# Patient Record
Sex: FEMALE | Race: WHITE | NOT HISPANIC OR LATINO | Employment: FULL TIME | URBAN - METROPOLITAN AREA
[De-identification: names, ages, dates, MRNs, and addresses within clinical notes are randomized per-mention and may not be internally consistent; named-entity substitution may affect disease eponyms.]

---

## 2017-01-18 ENCOUNTER — OFFICE VISIT (OUTPATIENT)
Dept: INTERNAL MEDICINE | Facility: CLINIC | Age: 57
End: 2017-01-18

## 2017-01-18 VITALS
OXYGEN SATURATION: 98 % | TEMPERATURE: 97.5 F | BODY MASS INDEX: 33.46 KG/M2 | WEIGHT: 196 LBS | RESPIRATION RATE: 16 BRPM | HEART RATE: 66 BPM | HEIGHT: 64 IN | DIASTOLIC BLOOD PRESSURE: 80 MMHG | SYSTOLIC BLOOD PRESSURE: 130 MMHG

## 2017-01-18 DIAGNOSIS — E03.9 ACQUIRED HYPOTHYROIDISM: ICD-10-CM

## 2017-01-18 DIAGNOSIS — E78.5 HYPERLIPIDEMIA, UNSPECIFIED HYPERLIPIDEMIA TYPE: ICD-10-CM

## 2017-01-18 DIAGNOSIS — R73.02 IMPAIRED GLUCOSE TOLERANCE: ICD-10-CM

## 2017-01-18 DIAGNOSIS — K63.5 COLON POLYP: Primary | ICD-10-CM

## 2017-01-18 PROCEDURE — 99213 OFFICE O/P EST LOW 20 MIN: CPT | Performed by: INTERNAL MEDICINE

## 2017-01-18 NOTE — PROGRESS NOTES
Subjective   Bhavna Elliott is a 56 y.o. female.   12/30/2016  Wt Readings from Last 1 Encounters:   05/23/16 194 lb (88 kg)    she was last seen May 2016, weight 190 then, now 196.    She returns for follow-up of hypothyroidism, hyperlipidemia, impaired glucose tolerance    History of Present Illness   She has hypothyroidism which is treated with levothyroxin 112 µg daily.  No palpitations or other symptoms described.    She has hyperlipidemia treated with Crestor 10 mg daily.  No problems with the medication.  She exercises at a gym about 3 days a week and does treadmill for 30-40 minutes and then some weight exercises for her upper extremities.  No exertional chest pain has occurred.    She has impaired glucose tolerance for which diet weight reduction and regular exercise been advised.  She does not describe polydipsia polyuria or nocturia.    She is perimenopausal and is followed by Dr. Melvin Atwood, gynecologist.    She had colonoscopy July 2012 revealing a sigmoid colon polyp.  She will be due for repeat screening colonoscopy this year.  The following portions of the patient's history were reviewed and updated as appropriate: allergies, current medications, past family history, past medical history, past social history, past surgical history and problem list.    Review of Systems   Constitutional: Negative.    HENT: Negative.    Eyes: Negative.    Respiratory: Negative.    Cardiovascular: Negative.    Endocrine: Negative.    Genitourinary: Negative.    Skin: Negative.    Neurological: Negative.    Hematological: Negative.    Psychiatric/Behavioral: Negative.        Objective   Physical Exam   Constitutional: She appears well-developed and well-nourished.   HENT:   Head: Normocephalic and atraumatic.   Cardiovascular: Normal rate and regular rhythm.  Exam reveals no gallop and no friction rub.    No murmur heard.  Pulmonary/Chest: Effort normal and breath sounds normal. No respiratory distress. She has no  wheezes. She has no rales.   Abdominal: Soft. Bowel sounds are normal. She exhibits no distension and no mass. There is no tenderness.   Neurological: She is alert.   Skin: Skin is warm.   Psychiatric: Her behavior is normal.   Vitals reviewed.      Assessment/Plan   Bhavna was seen today for hyperlipidemia, hypothyroidism and prediabetes.    Diagnoses and all orders for this visit:    Colon polyp  Comments:  Sigmoid colon polyp on colonoscopy July 2012, due for a repeat colonoscopy around July 2017, Dr. Saldana    Hyperlipidemia, unspecified hyperlipidemia type  Comments:  Continue Crestor 10 mg daily, we will check chemistries and lipids  Orders:  -     Comprehensive Metabolic Panel  -     Lipid Panel    Impaired glucose tolerance  Comments:  Advise healthy diet weight reduction and regular exercise, will check hemoglobin A1c level  Orders:  -     Hemoglobin A1c    Acquired hypothyroidism  Comments:  Continue levothyroxin 112 µg daily, will check thyroid levels  Orders:  -     TSH     postmenopausal, followed by Dr. Melvin Atwood    Schedule office follow-up about 6 months, return sooner if needed                         EMR Dragon/Transcription disclaimer:      Much of this encounter note is an electronic transcription/translation of spoken language to printed text. The electronic translation of spoken language may permit erroneous, or at times, nonsensical words or phrases to be inadvertently transcribed; Although I have reviewed the note for such errors, some may still exist.

## 2017-01-18 NOTE — MR AVS SNAPSHOT
Bhavna Atwooding   1/18/2017 11:00 AM   Office Visit    Dept Phone:  [phone]   Encounter #:  [id number]    Provider:  Oscar Syed MD   Department:  [facility] INTERNAL MEDICINE                Your Full Care Plan              Your Updated Medication List          This list is accurate as of: 1/18/17 11:35 AM.  Always use your most recent med list.                CRESTOR 10 MG tablet   Generic drug:  rosuvastatin   TAKE 1 TABLET DAILY       levothyroxine 112 MCG tablet   Commonly known as:  SYNTHROID, LEVOTHROID   TAKE 1 TABLET BY MOUTH EVERY DAY FOR THYROID               We Performed the Following     Comprehensive Metabolic Panel     Hemoglobin A1c     Lipid Panel     TSH       You Were Diagnosed With        Codes Comments    Colon polyp    -  Primary ICD-10-CM: K63.5  ICD-9-CM: 211.3 Sigmoid colon polyp on colonoscopy July 2012, due for a repeat colonoscopy around July 2017, Dr. Saldana    Hyperlipidemia, unspecified hyperlipidemia type     ICD-10-CM: E78.5  ICD-9-CM: 272.4 Continue Crestor 10 mg daily, we will check chemistries and lipids    Impaired glucose tolerance     ICD-10-CM: R73.02  ICD-9-CM: 790.22 Advise healthy diet weight reduction and regular exercise, will check hemoglobin A1c level    Acquired hypothyroidism     ICD-10-CM: E03.9  ICD-9-CM: 244.9 Continue levothyroxin 112 µg daily, will check thyroid levels      Instructions     None    Patient Instructions History      Upcoming Appointments     Visit Type Date Time Department    OFFICE VISIT 1/18/2017 11:00 AM [facility] 1 [id number]    OFFICE VISIT 7/26/2017  9:40 AM [facility] 1 4003      MyChart Signup     Our records indicate that you have declined [employer] signup. If you would like to sign up for [employer], please email [email or web address] or call [phone] to obtain an activation code.             Other Info from Your Visit           Your Appointments     Jul 26, 2017   "9:40 AM EDT   Office Visit with Oscar Syed MD   [facility] INTERNAL MEDICINE (--)    [address] [facility]  [place] [address] [phone]           Arrive 15 minutes prior to appointment.              Allergies     No Known Allergies      Reason for Visit     Hyperlipidemia     Hypothyroidism     Prediabetes           Vital Signs     Blood Pressure Pulse Temperature Respirations Height Weight    130/80 (BP Location: Left arm, Patient Position: Sitting, Cuff Size: Large Adult) 66 97.5 °F (36.4 °C) (Tympanic) 16 64\" (162.6 cm) 196 lb (88.9 kg)    Oxygen Saturation Body Mass Index Smoking Status             98% 33.64 kg/m2 Never Smoker         Problems and Diagnoses Noted     Colon polyp    High cholesterol or triglycerides    Underactive thyroid    Impaired glucose tolerance        "

## 2017-01-19 LAB
ALBUMIN SERPL-MCNC: 4.7 G/DL (ref 3.5–5.2)
ALBUMIN/GLOB SERPL: 1.5 G/DL
ALP SERPL-CCNC: 42 U/L (ref 39–117)
ALT SERPL-CCNC: 24 U/L (ref 1–33)
AST SERPL-CCNC: 20 U/L (ref 1–32)
BILIRUB SERPL-MCNC: 0.7 MG/DL (ref 0.1–1.2)
BUN SERPL-MCNC: 14 MG/DL (ref 6–20)
BUN/CREAT SERPL: 17.7 (ref 7–25)
CALCIUM SERPL-MCNC: 9.9 MG/DL (ref 8.6–10.5)
CHLORIDE SERPL-SCNC: 105 MMOL/L (ref 98–107)
CHOLEST SERPL-MCNC: 197 MG/DL (ref 0–200)
CO2 SERPL-SCNC: 25.3 MMOL/L (ref 22–29)
CREAT SERPL-MCNC: 0.79 MG/DL (ref 0.57–1)
GLOBULIN SER CALC-MCNC: 3.1 GM/DL
GLUCOSE SERPL-MCNC: 105 MG/DL (ref 65–99)
HBA1C MFR BLD: 5.4 % (ref 4.8–5.6)
HDLC SERPL-MCNC: 64 MG/DL (ref 40–60)
LDLC SERPL CALC-MCNC: 119 MG/DL (ref 0–100)
POTASSIUM SERPL-SCNC: 4.2 MMOL/L (ref 3.5–5.2)
PROT SERPL-MCNC: 7.8 G/DL (ref 6–8.5)
SODIUM SERPL-SCNC: 143 MMOL/L (ref 136–145)
TRIGL SERPL-MCNC: 72 MG/DL (ref 0–150)
TSH SERPL DL<=0.005 MIU/L-ACNC: 1.4 MIU/ML (ref 0.27–4.2)
VLDLC SERPL CALC-MCNC: 14.4 MG/DL (ref 5–40)

## 2017-02-09 ENCOUNTER — APPOINTMENT (OUTPATIENT)
Dept: WOMENS IMAGING | Facility: HOSPITAL | Age: 57
End: 2017-02-09

## 2017-02-09 PROCEDURE — 77067 SCR MAMMO BI INCL CAD: CPT | Performed by: RADIOLOGY

## 2017-02-24 ENCOUNTER — APPOINTMENT (OUTPATIENT)
Dept: WOMENS IMAGING | Facility: HOSPITAL | Age: 57
End: 2017-02-24

## 2017-02-24 PROCEDURE — 77065 DX MAMMO INCL CAD UNI: CPT | Performed by: RADIOLOGY

## 2017-02-24 PROCEDURE — 76641 ULTRASOUND BREAST COMPLETE: CPT | Performed by: RADIOLOGY

## 2017-02-24 PROCEDURE — G0206 DX MAMMO INCL CAD UNI: HCPCS | Performed by: RADIOLOGY

## 2017-02-24 PROCEDURE — G0279 TOMOSYNTHESIS, MAMMO: HCPCS | Performed by: RADIOLOGY

## 2017-02-24 PROCEDURE — 77061 BREAST TOMOSYNTHESIS UNI: CPT | Performed by: RADIOLOGY

## 2017-04-27 RX ORDER — LEVOTHYROXINE SODIUM 112 UG/1
TABLET ORAL
Qty: 90 TABLET | Refills: 0 | Status: SHIPPED | OUTPATIENT
Start: 2017-04-27 | End: 2017-09-02 | Stop reason: SDUPTHER

## 2017-05-15 RX ORDER — ROSUVASTATIN CALCIUM 10 MG/1
TABLET, COATED ORAL
Qty: 90 TABLET | Refills: 2 | Status: SHIPPED | OUTPATIENT
Start: 2017-05-15 | End: 2018-02-16 | Stop reason: SDUPTHER

## 2017-08-14 RX ORDER — LEVOTHYROXINE SODIUM 112 UG/1
TABLET ORAL
Qty: 90 TABLET | Refills: 0 | OUTPATIENT
Start: 2017-08-14

## 2017-08-30 ENCOUNTER — OFFICE VISIT (OUTPATIENT)
Dept: INTERNAL MEDICINE | Facility: CLINIC | Age: 57
End: 2017-08-30

## 2017-08-30 VITALS
BODY MASS INDEX: 34.49 KG/M2 | RESPIRATION RATE: 16 BRPM | SYSTOLIC BLOOD PRESSURE: 130 MMHG | WEIGHT: 202 LBS | HEIGHT: 64 IN | HEART RATE: 68 BPM | TEMPERATURE: 96.7 F | OXYGEN SATURATION: 97 % | DIASTOLIC BLOOD PRESSURE: 80 MMHG

## 2017-08-30 DIAGNOSIS — R73.02 IMPAIRED GLUCOSE TOLERANCE: ICD-10-CM

## 2017-08-30 DIAGNOSIS — K63.5 COLON POLYP: ICD-10-CM

## 2017-08-30 DIAGNOSIS — E78.5 HYPERLIPIDEMIA, UNSPECIFIED HYPERLIPIDEMIA TYPE: ICD-10-CM

## 2017-08-30 DIAGNOSIS — E03.9 ACQUIRED HYPOTHYROIDISM: Primary | ICD-10-CM

## 2017-08-30 LAB — HBA1C MFR BLD: 5.9 % (ref 4.8–5.6)

## 2017-08-30 PROCEDURE — 99213 OFFICE O/P EST LOW 20 MIN: CPT | Performed by: INTERNAL MEDICINE

## 2017-08-30 NOTE — PROGRESS NOTES
Subjective   Bhavna Elliott is a 56 y.o. female.   8/11/2017  Wt Readings from Last 1 Encounters:   08/30/17 202 lb (91.6 kg)    she was last seen in January 2017, weight 194 pounds then, now 202.    She returns for follow-up of hypothyroidism, hyperlipidemia, impaired glucose tolerance, history of colon polyps    History of Present Illness    she has hypothyroidism treated with levothyroxin 112 µg daily.  She does not describe any palpitations or other thyroid symptoms.    She has hyperlipidemia treated with Crestor 10 mg daily.  This is probably familial hyperlipidemia.  No medication problems described.  No cardiac symptoms have occurred in no cardiac history.    She has impaired glucose tolerance for which diet, weight reduction, and regular exercise of been advised.  Her last hemoglobin A1c level was 5.4% in January.    She had colonoscopy July 2012 revealing sigmoid colon polyp.  She is going to see Dr. Saldana for colonoscopy later this year.    Dr. Melvin Atwood is her gynecologist  The following portions of the patient's history were reviewed and updated as appropriate: allergies, current medications, past family history, past medical history, past social history, past surgical history and problem list.    Review of Systems   Constitutional: Negative.    HENT: Negative.    Eyes: Negative.    Respiratory: Negative.    Cardiovascular: Negative.    Endocrine: Negative.    Genitourinary: Negative.    Skin: Negative.    Neurological: Negative.    Hematological: Negative.    Psychiatric/Behavioral: Negative.        Objective   Physical Exam   Constitutional: She appears well-developed and well-nourished.   HENT:   Head: Normocephalic and atraumatic.   Cardiovascular: Normal rate and regular rhythm.  Exam reveals no gallop and no friction rub.    No murmur heard.  Pulmonary/Chest: Effort normal and breath sounds normal. No respiratory distress. She has no wheezes. She has no rales.   Abdominal: Soft. Bowel sounds  are normal. She exhibits no distension and no mass. There is no tenderness.   Neurological: She is alert.   Skin: Skin is warm.   Psychiatric: Her behavior is normal.   Vitals reviewed.      Assessment/Plan   Bhavna was seen today for hypothyroidism, hyperlipidemia and prediabetes.    Diagnoses and all orders for this visit:    Acquired hypothyroidism  Comments:   continue levothyroxin 112   Orders:  -     TSH  -     T4, Free    Hyperlipidemia, unspecified hyperlipidemia type  Comments:   continue Crestor 10 mg daily, we will check chemistries and lipids  Orders:  -     Comprehensive Metabolic Panel  -     Lipid Panel    Impaired glucose tolerance  Comments:   advise healthy diet, weight reduction, regular exercise, will check hemoglobin A1c level  Orders:  -     Hemoglobin A1c    Colon polyp  Comments:   to see Dr. Saldana for colonoscopy           schedule office follow-up about 6 months, return sooner if needed                     EMR Dragon/Transcription disclaimer:      Much of this encounter note is an electronic transcription/translation of spoken language to printed text. The electronic translation of spoken language may permit erroneous, or at times, nonsensical words or phrases to be inadvertently transcribed; Although I have reviewed the note for such errors, some may still exist.

## 2017-08-31 LAB
ALBUMIN SERPL-MCNC: 4.7 G/DL (ref 3.5–5.2)
ALBUMIN/GLOB SERPL: 1.4 G/DL
ALP SERPL-CCNC: 40 U/L (ref 39–117)
ALT SERPL-CCNC: 22 U/L (ref 1–33)
AST SERPL-CCNC: 18 U/L (ref 1–32)
BILIRUB SERPL-MCNC: 0.4 MG/DL (ref 0.1–1.2)
BUN SERPL-MCNC: 15 MG/DL (ref 6–20)
BUN/CREAT SERPL: 20.5 (ref 7–25)
CALCIUM SERPL-MCNC: 10.1 MG/DL (ref 8.6–10.5)
CHLORIDE SERPL-SCNC: 101 MMOL/L (ref 98–107)
CHOLEST SERPL-MCNC: 181 MG/DL (ref 0–200)
CO2 SERPL-SCNC: 24.5 MMOL/L (ref 22–29)
CREAT SERPL-MCNC: 0.73 MG/DL (ref 0.57–1)
GLOBULIN SER CALC-MCNC: 3.3 GM/DL
GLUCOSE SERPL-MCNC: 94 MG/DL (ref 65–99)
HDLC SERPL-MCNC: 58 MG/DL (ref 40–60)
LDLC SERPL CALC-MCNC: 102 MG/DL (ref 0–100)
POTASSIUM SERPL-SCNC: 4.3 MMOL/L (ref 3.5–5.2)
PROT SERPL-MCNC: 8 G/DL (ref 6–8.5)
SODIUM SERPL-SCNC: 141 MMOL/L (ref 136–145)
T4 FREE SERPL-MCNC: 1.4 NG/DL (ref 0.93–1.7)
TRIGL SERPL-MCNC: 104 MG/DL (ref 0–150)
TSH SERPL DL<=0.005 MIU/L-ACNC: 1.14 MIU/ML (ref 0.27–4.2)
VLDLC SERPL CALC-MCNC: 20.8 MG/DL (ref 5–40)

## 2017-09-05 RX ORDER — LEVOTHYROXINE SODIUM 112 UG/1
TABLET ORAL
Qty: 90 TABLET | Refills: 0 | Status: SHIPPED | OUTPATIENT
Start: 2017-09-05 | End: 2017-12-08 | Stop reason: SDUPTHER

## 2017-09-08 ENCOUNTER — PREP FOR SURGERY (OUTPATIENT)
Dept: OTHER | Facility: HOSPITAL | Age: 57
End: 2017-09-08

## 2017-09-08 DIAGNOSIS — Z12.11 COLON CANCER SCREENING: Primary | ICD-10-CM

## 2017-09-08 DIAGNOSIS — Z86.010 HISTORY OF COLON POLYPS: ICD-10-CM

## 2017-09-08 PROBLEM — Z86.0100 HISTORY OF COLON POLYPS: Status: ACTIVE | Noted: 2017-09-08

## 2017-11-20 ENCOUNTER — HOSPITAL ENCOUNTER (OUTPATIENT)
Facility: HOSPITAL | Age: 57
Setting detail: HOSPITAL OUTPATIENT SURGERY
Discharge: HOME | End: 2017-11-20
Attending: SURGERY | Admitting: SURGERY

## 2017-11-20 ENCOUNTER — ANESTHESIA EVENT (OUTPATIENT)
Dept: GASTROENTEROLOGY | Facility: HOSPITAL | Age: 57
End: 2017-11-20

## 2017-11-20 ENCOUNTER — ANESTHESIA (OUTPATIENT)
Dept: GASTROENTEROLOGY | Facility: HOSPITAL | Age: 57
End: 2017-11-20

## 2017-11-20 VITALS
RESPIRATION RATE: 18 BRPM | BODY MASS INDEX: 32.89 KG/M2 | HEIGHT: 65 IN | OXYGEN SATURATION: 96 % | SYSTOLIC BLOOD PRESSURE: 116 MMHG | HEART RATE: 61 BPM | WEIGHT: 197.38 LBS | TEMPERATURE: 98 F | DIASTOLIC BLOOD PRESSURE: 78 MMHG

## 2017-11-20 DIAGNOSIS — Z86.010 HISTORY OF COLON POLYPS: ICD-10-CM

## 2017-11-20 DIAGNOSIS — Z12.11 COLON CANCER SCREENING: ICD-10-CM

## 2017-11-20 PROCEDURE — 88305 TISSUE EXAM BY PATHOLOGIST: CPT | Performed by: SURGERY

## 2017-11-20 PROCEDURE — 25010000002 GLUCAGON (RDNA) PER 1 MG: Performed by: SURGERY

## 2017-11-20 PROCEDURE — 45380 COLONOSCOPY AND BIOPSY: CPT | Performed by: SURGERY

## 2017-11-20 PROCEDURE — 25010000002 PROPOFOL 1000 MG/ML EMULSION: Performed by: ANESTHESIOLOGY

## 2017-11-20 PROCEDURE — 25010000002 PROPOFOL 10 MG/ML EMULSION: Performed by: ANESTHESIOLOGY

## 2017-11-20 RX ORDER — PROPOFOL 10 MG/ML
VIAL (ML) INTRAVENOUS AS NEEDED
Status: DISCONTINUED | OUTPATIENT
Start: 2017-11-20 | End: 2017-11-20 | Stop reason: SURG

## 2017-11-20 RX ORDER — LIDOCAINE HYDROCHLORIDE 10 MG/ML
0.5 INJECTION, SOLUTION INFILTRATION; PERINEURAL ONCE AS NEEDED
Status: DISCONTINUED | OUTPATIENT
Start: 2017-11-20 | End: 2017-11-20 | Stop reason: HOSPADM

## 2017-11-20 RX ORDER — LIDOCAINE HYDROCHLORIDE 20 MG/ML
INJECTION, SOLUTION INFILTRATION; PERINEURAL AS NEEDED
Status: DISCONTINUED | OUTPATIENT
Start: 2017-11-20 | End: 2017-11-20 | Stop reason: SURG

## 2017-11-20 RX ORDER — SODIUM CHLORIDE, SODIUM LACTATE, POTASSIUM CHLORIDE, CALCIUM CHLORIDE 600; 310; 30; 20 MG/100ML; MG/100ML; MG/100ML; MG/100ML
1000 INJECTION, SOLUTION INTRAVENOUS CONTINUOUS PRN
Status: DISCONTINUED | OUTPATIENT
Start: 2017-11-20 | End: 2017-11-20 | Stop reason: HOSPADM

## 2017-11-20 RX ORDER — SODIUM CHLORIDE 0.9 % (FLUSH) 0.9 %
3 SYRINGE (ML) INJECTION AS NEEDED
Status: DISCONTINUED | OUTPATIENT
Start: 2017-11-20 | End: 2017-11-20 | Stop reason: HOSPADM

## 2017-11-20 RX ADMIN — LIDOCAINE HYDROCHLORIDE 50 MG: 20 INJECTION, SOLUTION INFILTRATION; PERINEURAL at 07:47

## 2017-11-20 RX ADMIN — PROPOFOL 160 MG: 10 INJECTION, EMULSION INTRAVENOUS at 07:47

## 2017-11-20 RX ADMIN — PROPOFOL 140 MCG/KG/MIN: 10 INJECTION, EMULSION INTRAVENOUS at 07:47

## 2017-11-20 RX ADMIN — SODIUM CHLORIDE, POTASSIUM CHLORIDE, SODIUM LACTATE AND CALCIUM CHLORIDE 1000 ML: 600; 310; 30; 20 INJECTION, SOLUTION INTRAVENOUS at 06:52

## 2017-11-20 NOTE — H&P
Cc: Endoscopy Visit    HPI: 57 y.o. female here for screening colonoscopy, having had a prior hyperplastic polyp, and no family history of colon cancer.    Past Medical History:   Diagnosis Date   • Disease of thyroid gland        History reviewed. No pertinent surgical history.    has No Known Allergies.       Medication List      ASK your doctor about these medications          hydrocortisone 2.5 % cream       levothyroxine 112 MCG tablet   Commonly known as:  SYNTHROID, LEVOTHROID   TAKE 1 TABLET BY MOUTH EVERY DAY FOR THYROID       rosuvastatin 10 MG tablet   Commonly known as:  CRESTOR   TAKE 1 TABLET BY MOUTH EVERY DAY           Family History   Problem Relation Age of Onset   • Hyperlipidemia Mother    • Alzheimer's disease Father        Social History     Social History   • Marital status:      Spouse name: N/A   • Number of children: N/A   • Years of education: N/A     Occupational History   • Not on file.     Social History Main Topics   • Smoking status: Never Smoker   • Smokeless tobacco: Never Used   • Alcohol use Yes      Comment: Socially   • Drug use: No   • Sexual activity: Defer     Other Topics Concern   • Not on file     Social History Narrative       Vitals:    11/20/17 0646   BP: 131/69   Pulse: 57   Resp: 16   Temp: 98.7 °F (37.1 °C)   SpO2: 97%       Body mass index is 32.84 kg/(m^2).    Physical Exam    General: No acute distress  Lungs: No labored breathing, Pulse oximetry on room air is 97%.  Heart: RRR  Abdo: Soft  Mental:  Awake, alert, and oriented    Imp:     · Screening  · History of hyperplastic polyp     Plan:  · C scope, and if normal, colonoscopy in 10 years.    Nidia Saldana MD  7:46 AM

## 2017-11-20 NOTE — ANESTHESIA POSTPROCEDURE EVALUATION
"Patient: Bhavna JAUREGUI Elliott    Procedure Summary     Date Anesthesia Start Anesthesia Stop Room / Location    11/20/17 0743 0801  [facility] /  [facility]       Procedure Diagnosis Surgeon Provider    COLONOSCOPY TO CECUM  WITH COLD BIOPSY POLYPECTOMY (N/A ) Colon cancer screening; History of colon polyps  (Colon cancer screening [Z12.11]; History of colon polyps [Z86.010]) MD Arianne Ludwig MD          Anesthesia Type: No value filed.  Last vitals  BP   117/71 (11/20/17 0804)   Temp   36.7 °C (98 °F) (11/20/17 0804)   Pulse   61 (11/20/17 0804)   Resp   18 (11/20/17 0804)     SpO2   99 % (11/20/17 0804)     Post Anesthesia Care and Evaluation    Patient location during evaluation: bedside  Patient participation: complete - patient participated  Level of consciousness: awake and alert  Pain management: adequate  Airway patency: patent  Anesthetic complications: No anesthetic complications  PONV Status: none  Cardiovascular status: acceptable  Respiratory status: acceptable  Hydration status: acceptable    Comments: /71 (BP Location: Left arm, Patient Position: Lying)  Pulse 61  Temp 36.7 °C (98 °F) (Oral)   Resp 18  Ht 65\" (165.1 cm)  Wt 197 lb 6 oz (89.5 kg)  SpO2 99%  BMI 32.84 kg/m2        "

## 2017-11-20 NOTE — DISCHARGE INSTRUCTIONS
For the next 24 hours patient needs to be with a responsible adult.    For 24 hours DO NOT drive, operate machinery, appliances, drink alcohol, make important decisions or sign legal documents.    Start with a light or bland diet and advance to regular diet as tolerated.    Follow recommendations on procedure report provided by your doctor.    Call Dr Saldana for problems [phone]    Problems may include but not limited to: large amounts of bleeding, trouble breathing, repeated vomiting, severe unrelieved pain, fever or chills.

## 2017-11-20 NOTE — ANESTHESIA PREPROCEDURE EVALUATION
Anesthesia Evaluation     Patient summary reviewed   NPO Solid Status: > 8 hours  NPO Liquid Status: > 8 hours     Airway   Mallampati: II  TM distance: >3 FB  Dental      Pulmonary    Cardiovascular     Rhythm: regular  Rate: normal    (+) hyperlipidemia      Neuro/Psych  GI/Hepatic/Renal/Endo    (+) obesity,  hypothyroidism,     Musculoskeletal     Abdominal    Substance History      OB/GYN          Other                                        Anesthesia Plan    ASA 2       total IV anesthesia  Anesthetic plan and risks discussed with patient.

## 2017-11-20 NOTE — OP NOTE
Colonoscopy Procedure Note  Bhavna Elliott  1960  Date of Procedure: 11/20/17    Pre-operative Diagnosis:    · screening    Post-operative Diagnosis:  · Minute ascending colon polyp    Procedure: Colonoscopy with cold biopsy polypectomy     Findings/Treatments:   · 3 mm ascending colon polyp, likely lymphocytic hyperplasia/removed via cold biopsy forceps       Recommendations:   · C scope based on pathology.  Call next week for results and recommendations.  · Copy of photographs to be given from today's procedure prior to discharge.    Surgeon: Shana    Anesthetic: MAC per Dr Foote  Indications:  As above    Scope Withdrawal Time:  6 minutes  15 seconds    Procedure Details     MAC anesthesia was induced.  The 180 Colonoscopy was inserted blindly into the rectum and advanced to the cecum, with relative ease,  without need for pressure, lift, or turning.  Cecum was identified by ileocecal valve and appendiceal orifice and photographed for documentation.  Prep quality wasexcellent.  A careful inspection was made as the colonoscope was withdrawn, including a retroflexed view of the rectum; there was no suggestion of presence of angiodysplasias, colitis, but there was the single minute polyp, removed via cold biopsy forceps.  There were no diverticula.    Retroflexion in the rectum revno abnormalities.    Nidia Saldana MD  11/20/17   8:05 AM  Monday

## 2017-11-21 LAB
CYTO UR: NORMAL
LAB AP CASE REPORT: NORMAL
Lab: NORMAL
PATH REPORT.FINAL DX SPEC: NORMAL
PATH REPORT.GROSS SPEC: NORMAL

## 2017-11-22 NOTE — PROGRESS NOTES
Winifred (endoscopy liaison),    Please call patient to inform them of these findings and recommendations and ensure that any pamphlets that were to be given to the patient at the hospital were received.     Please make sure a letter is sent to the patient, recall method entered into the computer and the HIM tab updated as far as need for endoscopy follow up.    Thanks  Dr Saldana    Date of Procedure: 11/20/17     Pre-operative Diagnosis:    · screening     Post-operative Diagnosis:  · Minute ascending colon polyp     Procedure: Colonoscopy with cold biopsy polypectomy      Findings/Treatments:   · 3 mm ascending colon polyp, likely lymphocytic hyperplasia/removed via cold biopsy forceps      Recommendations:   · C scope based on pathology.  Call next week for results and recommendations.;  C SCOPE IN 10 YEARS SINCE POLYP HYPERPLASTIC  · Copy of photographs to be given from today's procedure prior to discharge.

## 2017-11-27 ENCOUNTER — TELEPHONE (OUTPATIENT)
Dept: SURGERY | Facility: CLINIC | Age: 57
End: 2017-11-27

## 2017-11-27 NOTE — TELEPHONE ENCOUNTER
----- Message from Nidia Saldana MD sent at 11/22/2017  4:34 PM EST -----  Winifred (endoscopy liaison),    Please call patient to inform them of these findings and recommendations and ensure that any pamphlets that were to be given to the patient at the hospital were received.     Please make sure a letter is sent to the patient, recall method entered into the computer and the HIM tab updated as far as need for endoscopy follow up.    Thanks  Dr Saldana    Date of Procedure: 11/20/17     Pre-operative Diagnosis:    · screening     Post-operative Diagnosis:  · Minute ascending colon polyp     Procedure: Colonoscopy with cold biopsy polypectomy      Findings/Treatments:   · 3 mm ascending colon polyp, likely lymphocytic hyperplasia/removed via cold biopsy forceps      Recommendations:   · C scope based on pathology.  Call next week for results and recommendations.;  C SCOPE IN 10 YEARS SINCE POLYP HYPERPLASTIC  · Copy of photographs to be given from today's procedure prior to discharge.

## 2017-12-08 RX ORDER — LEVOTHYROXINE SODIUM 112 UG/1
TABLET ORAL
Qty: 90 TABLET | Refills: 0 | Status: SHIPPED | OUTPATIENT
Start: 2017-12-08 | End: 2018-02-24 | Stop reason: SDUPTHER

## 2018-02-16 RX ORDER — ROSUVASTATIN CALCIUM 10 MG/1
TABLET, COATED ORAL
Qty: 90 TABLET | Refills: 2 | Status: SHIPPED | OUTPATIENT
Start: 2018-02-16 | End: 2018-12-18 | Stop reason: SDUPTHER

## 2018-02-26 RX ORDER — LEVOTHYROXINE SODIUM 112 UG/1
TABLET ORAL
Qty: 90 TABLET | Refills: 0 | Status: SHIPPED | OUTPATIENT
Start: 2018-02-26 | End: 2018-06-12 | Stop reason: SDUPTHER

## 2018-03-12 ENCOUNTER — OFFICE VISIT (OUTPATIENT)
Dept: INTERNAL MEDICINE | Facility: CLINIC | Age: 58
End: 2018-03-12

## 2018-03-12 VITALS
WEIGHT: 203.4 LBS | BODY MASS INDEX: 33.89 KG/M2 | HEART RATE: 62 BPM | OXYGEN SATURATION: 98 % | TEMPERATURE: 96.7 F | SYSTOLIC BLOOD PRESSURE: 120 MMHG | HEIGHT: 65 IN | RESPIRATION RATE: 16 BRPM | DIASTOLIC BLOOD PRESSURE: 86 MMHG

## 2018-03-12 DIAGNOSIS — K63.5 POLYP OF ASCENDING COLON, UNSPECIFIED TYPE: ICD-10-CM

## 2018-03-12 DIAGNOSIS — E03.9 ACQUIRED HYPOTHYROIDISM: ICD-10-CM

## 2018-03-12 DIAGNOSIS — R73.02 IMPAIRED GLUCOSE TOLERANCE: ICD-10-CM

## 2018-03-12 DIAGNOSIS — E78.5 HYPERLIPIDEMIA, UNSPECIFIED HYPERLIPIDEMIA TYPE: Primary | ICD-10-CM

## 2018-03-12 PROCEDURE — 99213 OFFICE O/P EST LOW 20 MIN: CPT | Performed by: INTERNAL MEDICINE

## 2018-03-12 NOTE — PROGRESS NOTES
Subjective   Bhavna Elliott is a 57 y.o. female.   2/24/2018  Wt Readings from Last 1 Encounters:   03/12/18 92.3 kg (203 lb 6.4 oz)   She was last seen in August 2017, weight 202 then, now 203.    She returns for follow-up of hypothyroidism, hyperlipidemia, impaired glucose tolerance, history of colon polyps    History of Present Illness   She has hypothyroidism which is treated with levothyroxin 112 µg daily.  No thyroid symptoms are described.    She has hypercholesterolemia treated with Crestor 10 mg daily.  This is probably familial since both parents had lipid problems.  Dental problems with medication.  She does not have any cardiac history or symptoms.  She remains active physically.    She has impaired glucose tolerance and is been recommended healthy diet weight reduction regular exercise.    She had colonoscopy most recently in November 2017 by Dr. Saldana.  She had a small ascending colon polyp.  Previous colonoscopy was July 2012 with a sigmoid colon polyp.  He would seem appropriate for colonoscopy in about 5 years.    Dr. Melvni Atwood is her gynecologist.    She denies having any new problems.  The following portions of the patient's history were reviewed and updated as appropriate: allergies, current medications, past family history, past medical history, past social history, past surgical history and problem list.    Review of Systems   Constitutional: Negative.    HENT: Negative.    Eyes: Negative.    Respiratory: Negative.    Cardiovascular: Negative.    Endocrine: Negative.    Genitourinary: Negative.    Skin: Negative.    Neurological: Negative.    Hematological: Negative.    Psychiatric/Behavioral: Negative.        Objective   Physical Exam   Constitutional: She appears well-developed and well-nourished.   HENT:   Head: Normocephalic and atraumatic.   Cardiovascular: Normal rate and regular rhythm.  Exam reveals no gallop and no friction rub.    No murmur heard.  Pulmonary/Chest: Effort  normal and breath sounds normal. No respiratory distress. She has no wheezes. She has no rales.   Abdominal: Soft. Bowel sounds are normal. She exhibits no distension and no mass. There is no tenderness.   Neurological: She is alert.   Skin: Skin is warm.   Psychiatric: Her behavior is normal.   Vitals reviewed.      Assessment/Plan   Bhavna was seen today for hyperlipidemia, hypothyroidism and prediabetes.    Diagnoses and all orders for this visit:    Hyperlipidemia, unspecified hyperlipidemia type  Comments:  Continue Crestor 10 mg daily, we will check chemistries and lipids  Orders:  -     Comprehensive Metabolic Panel  -     Lipid Panel    Acquired hypothyroidism  Comments:  Continue levothyroxin 112 µg daily, will check thyroid levels  Orders:  -     TSH  -     T4, Free    Impaired glucose tolerance  Comments:  Advise healthy diet weight reduction regular exercise, we will check hemoglobin A1c and urinalysis  Orders:  -     Hemoglobin A1c  -     Urinalysis With Microscopic - Urine, Clean Catch    Polyp of ascending colon, unspecified type  Comments:  Last colonoscopy November 2017, anticipate next colonoscopy around November 2022        Schedule office follow-up about 6 months, return sooner if needed                       EMR Dragon/Transcription disclaimer:      Much of this encounter note is an electronic transcription/translation of spoken language to printed text. The electronic translation of spoken language may permit erroneous, or at times, nonsensical words or phrases to be inadvertently transcribed; Although I have reviewed the note for such errors, some may still exist.

## 2018-03-13 LAB
ALBUMIN SERPL-MCNC: 4.4 G/DL (ref 3.5–5.2)
ALBUMIN/GLOB SERPL: 1.5 G/DL
ALP SERPL-CCNC: 39 U/L (ref 39–117)
ALT SERPL-CCNC: 21 U/L (ref 1–33)
APPEARANCE UR: CLEAR
AST SERPL-CCNC: 15 U/L (ref 1–32)
BACTERIA #/AREA URNS HPF: NORMAL /HPF
BILIRUB SERPL-MCNC: 0.5 MG/DL (ref 0.1–1.2)
BILIRUB UR QL STRIP: NEGATIVE
BUN SERPL-MCNC: 16 MG/DL (ref 6–20)
BUN/CREAT SERPL: 21.3 (ref 7–25)
CALCIUM SERPL-MCNC: 9.4 MG/DL (ref 8.6–10.5)
CASTS URNS MICRO: NORMAL
CHLORIDE SERPL-SCNC: 103 MMOL/L (ref 98–107)
CHOLEST SERPL-MCNC: 185 MG/DL (ref 0–200)
CO2 SERPL-SCNC: 29.1 MMOL/L (ref 22–29)
COLOR UR: YELLOW
CREAT SERPL-MCNC: 0.75 MG/DL (ref 0.57–1)
EPI CELLS #/AREA URNS HPF: NORMAL /HPF
GFR SERPLBLD CREATININE-BSD FMLA CKD-EPI: 79 ML/MIN/1.73
GFR SERPLBLD CREATININE-BSD FMLA CKD-EPI: 96 ML/MIN/1.73
GLOBULIN SER CALC-MCNC: 3 GM/DL
GLUCOSE SERPL-MCNC: 112 MG/DL (ref 65–99)
GLUCOSE UR QL: NEGATIVE
HBA1C MFR BLD: 5.83 % (ref 4.8–5.6)
HDLC SERPL-MCNC: 59 MG/DL (ref 40–60)
HGB UR QL STRIP: NEGATIVE
KETONES UR QL STRIP: NEGATIVE
LDLC SERPL CALC-MCNC: 92 MG/DL (ref 0–100)
LEUKOCYTE ESTERASE UR QL STRIP: NEGATIVE
NITRITE UR QL STRIP: NEGATIVE
PH UR STRIP: 7 [PH] (ref 5–8)
POTASSIUM SERPL-SCNC: 4.3 MMOL/L (ref 3.5–5.2)
PROT SERPL-MCNC: 7.4 G/DL (ref 6–8.5)
PROT UR QL STRIP: NEGATIVE
RBC #/AREA URNS HPF: NORMAL /HPF
SODIUM SERPL-SCNC: 143 MMOL/L (ref 136–145)
SP GR UR: 1.02 (ref 1–1.03)
T4 FREE SERPL-MCNC: 1.41 NG/DL (ref 0.93–1.7)
TRIGL SERPL-MCNC: 171 MG/DL (ref 0–150)
TSH SERPL DL<=0.005 MIU/L-ACNC: 1.25 MIU/ML (ref 0.27–4.2)
UROBILINOGEN UR STRIP-MCNC: (no result) MG/DL
VLDLC SERPL CALC-MCNC: 34.2 MG/DL (ref 5–40)
WBC #/AREA URNS HPF: NORMAL /HPF

## 2018-05-02 ENCOUNTER — OFFICE VISIT (OUTPATIENT)
Dept: INTERNAL MEDICINE | Facility: CLINIC | Age: 58
End: 2018-05-02

## 2018-05-02 VITALS
WEIGHT: 202.4 LBS | HEART RATE: 67 BPM | DIASTOLIC BLOOD PRESSURE: 84 MMHG | OXYGEN SATURATION: 99 % | RESPIRATION RATE: 16 BRPM | SYSTOLIC BLOOD PRESSURE: 150 MMHG | TEMPERATURE: 96.3 F | BODY MASS INDEX: 33.72 KG/M2 | HEIGHT: 65 IN

## 2018-05-02 DIAGNOSIS — E78.5 HYPERLIPIDEMIA, UNSPECIFIED HYPERLIPIDEMIA TYPE: ICD-10-CM

## 2018-05-02 DIAGNOSIS — R10.84 GENERALIZED ABDOMINAL PAIN: Primary | ICD-10-CM

## 2018-05-02 DIAGNOSIS — E03.9 ACQUIRED HYPOTHYROIDISM: ICD-10-CM

## 2018-05-02 LAB
ALBUMIN SERPL-MCNC: 4.4 G/DL (ref 3.5–5.2)
ALBUMIN/GLOB SERPL: 1.4 G/DL
ALP SERPL-CCNC: 43 U/L (ref 39–117)
ALT SERPL-CCNC: 29 U/L (ref 1–33)
AST SERPL-CCNC: 20 U/L (ref 1–32)
BASOPHILS # BLD AUTO: 0.03 10*3/MM3 (ref 0–0.2)
BASOPHILS NFR BLD AUTO: 0.4 % (ref 0–1.5)
BILIRUB SERPL-MCNC: 0.4 MG/DL (ref 0.1–1.2)
BUN SERPL-MCNC: 17 MG/DL (ref 6–20)
BUN/CREAT SERPL: 25 (ref 7–25)
CALCIUM SERPL-MCNC: 9.3 MG/DL (ref 8.6–10.5)
CHLORIDE SERPL-SCNC: 101 MMOL/L (ref 98–107)
CO2 SERPL-SCNC: 24 MMOL/L (ref 22–29)
CREAT SERPL-MCNC: 0.68 MG/DL (ref 0.57–1)
EOSINOPHIL # BLD AUTO: 0.46 10*3/MM3 (ref 0–0.7)
EOSINOPHIL NFR BLD AUTO: 6.5 % (ref 0.3–6.2)
ERYTHROCYTE [DISTWIDTH] IN BLOOD BY AUTOMATED COUNT: 12.8 % (ref 11.7–13)
GFR SERPLBLD CREATININE-BSD FMLA CKD-EPI: 108 ML/MIN/1.73
GFR SERPLBLD CREATININE-BSD FMLA CKD-EPI: 89 ML/MIN/1.73
GLOBULIN SER CALC-MCNC: 3.2 GM/DL
GLUCOSE SERPL-MCNC: 83 MG/DL (ref 65–99)
HCT VFR BLD AUTO: 41.7 % (ref 35.6–45.5)
HGB BLD-MCNC: 13.5 G/DL (ref 11.9–15.5)
IMM GRANULOCYTES # BLD: 0 10*3/MM3 (ref 0–0.03)
IMM GRANULOCYTES NFR BLD: 0 % (ref 0–0.5)
LYMPHOCYTES # BLD AUTO: 2.86 10*3/MM3 (ref 0.9–4.8)
LYMPHOCYTES NFR BLD AUTO: 40.3 % (ref 19.6–45.3)
MCH RBC QN AUTO: 31.5 PG (ref 26.9–32)
MCHC RBC AUTO-ENTMCNC: 32.4 G/DL (ref 32.4–36.3)
MCV RBC AUTO: 97.4 FL (ref 80.5–98.2)
MONOCYTES # BLD AUTO: 0.47 10*3/MM3 (ref 0.2–1.2)
MONOCYTES NFR BLD AUTO: 6.6 % (ref 5–12)
NEUTROPHILS # BLD AUTO: 3.28 10*3/MM3 (ref 1.9–8.1)
NEUTROPHILS NFR BLD AUTO: 46.2 % (ref 42.7–76)
PLATELET # BLD AUTO: 214 10*3/MM3 (ref 140–500)
POTASSIUM SERPL-SCNC: 4.1 MMOL/L (ref 3.5–5.2)
PROT SERPL-MCNC: 7.6 G/DL (ref 6–8.5)
RBC # BLD AUTO: 4.28 10*6/MM3 (ref 3.9–5.2)
SODIUM SERPL-SCNC: 140 MMOL/L (ref 136–145)
WBC # BLD AUTO: 7.1 10*3/MM3 (ref 4.5–10.7)

## 2018-05-02 PROCEDURE — 99213 OFFICE O/P EST LOW 20 MIN: CPT | Performed by: INTERNAL MEDICINE

## 2018-05-02 NOTE — PROGRESS NOTES
Leo Elliott is a 57 y.o. female.   3/12/2018  Wt Readings from Last 1 Encounters:   05/02/18 91.8 kg (202 lb 6.4 oz)   She was last seen in March showed 2018.  She returns for an acute care visit regarding abdominal pain headache and abnormal bowel movement    History of Present Illness   She presents for an acute care visit describing present illness dating back about 2 weeks.  She has had some headache mostly in the cervical area and has felt swollen in her neck.  She is had some abdominal pain but no nausea or vomiting or diarrhea.  That one week ago she had a single white colored bowel movement but is not had any discoloration in bowel movement since that time.  She really the newspaper about light stools related to hepatitis A and has a concern in view of the present epidemic.  She had colonoscopy November 2017 by Dr. Saldana with a minute ascending colon polyp which was hyperplastic.  It is not been any significant change in diet.  She seldom consumes alcohol.  The following portions of the patient's history were reviewed and updated as appropriate: allergies, current medications, past family history, past medical history, past social history, past surgical history and problem list.    Review of Systems   Constitutional: Negative.    Eyes: Negative.    Respiratory: Negative.    Cardiovascular: Negative.    Endocrine: Negative.    Genitourinary: Negative.    Skin: Negative.    Neurological: Positive for headaches.   Hematological: Negative.    Psychiatric/Behavioral: Negative.        Objective   Physical Exam   Constitutional: She appears well-developed and well-nourished.   HENT:   Head: Normocephalic and atraumatic.   Mouth/Throat: No oropharyngeal exudate.   Cardiovascular: Normal rate and regular rhythm.  Exam reveals no gallop and no friction rub.    No murmur heard.  Pulmonary/Chest: Effort normal and breath sounds normal. No respiratory distress. She has no wheezes. She has no rales.  Right breast exhibits no inverted nipple, no mass, no nipple discharge, no skin change and no tenderness. Left breast exhibits no inverted nipple, no mass, no nipple discharge, no skin change and no tenderness.   Abdominal: Soft. Bowel sounds are normal. She exhibits no distension and no mass. There is no tenderness.   Neurological: She is alert.   Skin: Skin is warm.   Psychiatric: Her behavior is normal.   Vitals reviewed.      Assessment/Plan   Bhavna was seen today for abdominal pain.    Diagnoses and all orders for this visit:    Generalized abdominal pain  Comments:  We will check blood count and chemistry profile  Orders:  -     CBC & Differential  -     Comprehensive Metabolic Panel    Acquired hypothyroidism  Comments:  Continue levothyroxin 112 µg daily    Hyperlipidemia, unspecified hyperlipidemia type  Comments:  Continue Crestor 10 mg daily  Orders:  -     Comprehensive Metabolic Panel      Keep September follow-up as scheduled, return sooner if needed                         EMR Dragon/Transcription disclaimer:      Much of this encounter note is an electronic transcription/translation of spoken language to printed text. The electronic translation of spoken language may permit erroneous, or at times, nonsensical words or phrases to be inadvertently transcribed; Although I have reviewed the note for such errors, some may still exist.

## 2018-06-12 RX ORDER — LEVOTHYROXINE SODIUM 112 UG/1
TABLET ORAL
Qty: 90 TABLET | Refills: 0 | Status: SHIPPED | OUTPATIENT
Start: 2018-06-12 | End: 2018-09-22 | Stop reason: SDUPTHER

## 2018-09-24 RX ORDER — LEVOTHYROXINE SODIUM 112 UG/1
TABLET ORAL
Qty: 90 TABLET | Refills: 0 | Status: SHIPPED | OUTPATIENT
Start: 2018-09-24 | End: 2019-03-18 | Stop reason: SDUPTHER

## 2018-12-18 RX ORDER — ROSUVASTATIN CALCIUM 10 MG/1
10 TABLET, COATED ORAL DAILY
Qty: 90 TABLET | Refills: 2 | Status: SHIPPED | OUTPATIENT
Start: 2018-12-18 | End: 2019-09-18 | Stop reason: SDUPTHER

## 2019-02-19 ENCOUNTER — OFFICE VISIT (OUTPATIENT)
Dept: INTERNAL MEDICINE | Facility: CLINIC | Age: 59
End: 2019-02-19

## 2019-02-19 VITALS
HEIGHT: 65 IN | BODY MASS INDEX: 32.82 KG/M2 | DIASTOLIC BLOOD PRESSURE: 68 MMHG | SYSTOLIC BLOOD PRESSURE: 130 MMHG | WEIGHT: 197 LBS | OXYGEN SATURATION: 98 % | TEMPERATURE: 97.9 F | HEART RATE: 78 BPM

## 2019-02-19 DIAGNOSIS — E78.5 HYPERLIPIDEMIA, UNSPECIFIED HYPERLIPIDEMIA TYPE: Primary | ICD-10-CM

## 2019-02-19 DIAGNOSIS — E03.9 ACQUIRED HYPOTHYROIDISM: ICD-10-CM

## 2019-02-19 DIAGNOSIS — Z12.39 SCREENING FOR BREAST CANCER: ICD-10-CM

## 2019-02-19 DIAGNOSIS — R73.02 IMPAIRED GLUCOSE TOLERANCE: ICD-10-CM

## 2019-02-19 DIAGNOSIS — Z11.59 NEED FOR HEPATITIS C SCREENING TEST: ICD-10-CM

## 2019-02-19 PROBLEM — K63.5 COLON POLYP: Status: RESOLVED | Noted: 2017-01-18 | Resolved: 2019-02-19

## 2019-02-19 PROBLEM — R10.84 GENERALIZED ABDOMINAL PAIN: Status: RESOLVED | Noted: 2018-05-02 | Resolved: 2019-02-19

## 2019-02-19 PROCEDURE — 99214 OFFICE O/P EST MOD 30 MIN: CPT | Performed by: FAMILY MEDICINE

## 2019-02-19 NOTE — PROGRESS NOTES
Subjective   Bhavna Elliott is a 58 y.o. female.   Chief Complaint   Patient presents with   • Hyperlipidemia   • Hypothyroidism   • Hyperglycemia       History of Present Illness     This is a new patient in our office.      #1 hyperlipidemia-diagnosed years ago.  Patient is on Crestor 10 mg a day.  She takes it everyday.  No muscle aches, no muscle cramps.  She has no cardiac history.  She does not smoke cigarettes.  She never did.  She does not exercise regularly.      #2 impaired fasting glucose-BMI at 32.8.  No family history of diabetes.    #3 hypothyroidism-patient has no history of thyroid surgery.  She is on levothyroxine at 112 µg a day.  She takes everyday on empty stomach and does not eat for at least 15 minutes after taking it.    For more history please see health history form which was reviewed by me and will be scanned.      The following portions of the patient's history were reviewed and updated as appropriate: allergies, current medications, past family history, past medical history, past social history, past surgical history and problem list.    Review of Systems   Constitutional: Negative.    Respiratory: Negative.    Cardiovascular: Negative.    Gastrointestinal: Negative for blood in stool.   Musculoskeletal: Negative for myalgias.         Objective   Wt Readings from Last 3 Encounters:   02/19/19 89.4 kg (197 lb)   05/02/18 91.8 kg (202 lb 6.4 oz)   03/12/18 92.3 kg (203 lb 6.4 oz)      Vitals:    02/19/19 1136   BP: 130/68   Pulse: 78   Temp: 97.9 °F (36.6 °C)   SpO2: 98%     Temp Readings from Last 3 Encounters:   02/19/19 97.9 °F (36.6 °C)   05/02/18 96.3 °F (35.7 °C) (Tympanic)   03/12/18 96.7 °F (35.9 °C) (Tympanic)     BP Readings from Last 3 Encounters:   02/19/19 130/68   05/02/18 150/84   03/12/18 120/86     Pulse Readings from Last 3 Encounters:   02/19/19 78   05/02/18 67   03/12/18 62       Physical Exam   Constitutional: She is oriented to person, place, and time. She appears  well-developed and well-nourished.   HENT:   Head: Normocephalic and atraumatic.   Neck: Neck supple. Carotid bruit is not present. No thyromegaly present.   Cardiovascular: Normal rate, regular rhythm and normal heart sounds.   Pulmonary/Chest: Effort normal and breath sounds normal.   Neurological: She is alert and oriented to person, place, and time.   Skin: Skin is warm, dry and intact.   Psychiatric: She has a normal mood and affect. Her behavior is normal.       Assessment/Plan   Bhavna was seen today for hyperlipidemia, hypothyroidism and hyperglycemia.    Diagnoses and all orders for this visit:    Hyperlipidemia, unspecified hyperlipidemia type  -     Hemoglobin A1c  -     Lipid Panel With LDL / HDL Ratio  -     Comprehensive Metabolic Panel  -     Thyroid Cascade Profile    Acquired hypothyroidism  -     Hemoglobin A1c  -     Lipid Panel With LDL / HDL Ratio  -     Comprehensive Metabolic Panel  -     Thyroid Cascade Profile    Screening for breast cancer  -     Mammo Screening Bilateral With CAD; Future    Impaired glucose tolerance  -     Hemoglobin A1c  -     Comprehensive Metabolic Panel    Need for hepatitis C screening test  -     Hepatitis C Antibody        #1 hyperlipidemia-check labs.  Continue current treatment.  Follow-up in 6 months.    #2 impaired fasting glucose-increased risk for developing diabetes.  Weight loss can decrease it.  Decrease portion size, start exercise at least 30 minutes daily for 5 days a week.  Consider Weight Watchers/my fitness pal.  Check labs.  Follow-up in 6 months.     #3 hypothyroidism-check labs.  Do not eat for at least 30-60 minutes after taking levothyroxine.  Follow-up in 12 months.    Patient is due for mammogram and I am ordering it.  She follows up with Dr. Ratliff and is going to schedule office visit with her.  She is advised that Shingrix at the pharmacy.

## 2019-02-25 ENCOUNTER — TRANSCRIBE ORDERS (OUTPATIENT)
Dept: ADMINISTRATIVE | Facility: HOSPITAL | Age: 59
End: 2019-02-25

## 2019-02-25 DIAGNOSIS — Z12.31 SCREENING MAMMOGRAM, ENCOUNTER FOR: Primary | ICD-10-CM

## 2019-02-26 LAB
ALBUMIN SERPL-MCNC: 4.3 G/DL (ref 3.5–5.2)
ALBUMIN/GLOB SERPL: 1.3 G/DL
ALP SERPL-CCNC: 38 U/L (ref 39–117)
ALT SERPL-CCNC: 22 U/L (ref 1–33)
AST SERPL-CCNC: 17 U/L (ref 1–32)
BILIRUB SERPL-MCNC: 0.7 MG/DL (ref 0.1–1.2)
BUN SERPL-MCNC: 12 MG/DL (ref 6–20)
BUN/CREAT SERPL: 16.4 (ref 7–25)
CALCIUM SERPL-MCNC: 10 MG/DL (ref 8.6–10.5)
CHLORIDE SERPL-SCNC: 104 MMOL/L (ref 98–107)
CHOLEST SERPL-MCNC: 179 MG/DL (ref 0–200)
CO2 SERPL-SCNC: 23.5 MMOL/L (ref 22–29)
CREAT SERPL-MCNC: 0.73 MG/DL (ref 0.57–1)
GLOBULIN SER CALC-MCNC: 3.3 GM/DL
GLUCOSE SERPL-MCNC: 101 MG/DL (ref 65–99)
HBA1C MFR BLD: 5.84 % (ref 4.8–5.6)
HCV AB S/CO SERPL IA: 0.2 S/CO RATIO (ref 0–0.9)
HDLC SERPL-MCNC: 62 MG/DL (ref 40–60)
LDLC SERPL CALC-MCNC: 100 MG/DL (ref 0–100)
LDLC/HDLC SERPL: 1.61 {RATIO}
POTASSIUM SERPL-SCNC: 4.3 MMOL/L (ref 3.5–5.2)
PROT SERPL-MCNC: 7.6 G/DL (ref 6–8.5)
SODIUM SERPL-SCNC: 136 MMOL/L (ref 136–145)
TRIGL SERPL-MCNC: 87 MG/DL (ref 0–150)
TSH SERPL DL<=0.005 MIU/L-ACNC: 0.87 UIU/ML (ref 0.45–4.5)
VLDLC SERPL CALC-MCNC: 17.4 MG/DL (ref 5–40)

## 2019-03-04 ENCOUNTER — HOSPITAL ENCOUNTER (OUTPATIENT)
Dept: MAMMOGRAPHY | Facility: HOSPITAL | Age: 59
Discharge: HOME | End: 2019-03-04
Admitting: FAMILY MEDICINE

## 2019-03-04 DIAGNOSIS — Z12.31 SCREENING MAMMOGRAM, ENCOUNTER FOR: ICD-10-CM

## 2019-03-04 PROCEDURE — 77063 BREAST TOMOSYNTHESIS BI: CPT

## 2019-03-04 PROCEDURE — 77067 SCR MAMMO BI INCL CAD: CPT

## 2019-03-18 RX ORDER — LEVOTHYROXINE SODIUM 112 UG/1
TABLET ORAL
Qty: 90 TABLET | Refills: 2 | Status: SHIPPED | OUTPATIENT
Start: 2019-03-18 | End: 2020-01-13

## 2019-09-10 DIAGNOSIS — E78.5 HYPERLIPIDEMIA, UNSPECIFIED HYPERLIPIDEMIA TYPE: ICD-10-CM

## 2019-09-10 DIAGNOSIS — R73.02 IMPAIRED GLUCOSE TOLERANCE: Primary | ICD-10-CM

## 2019-09-11 LAB
ALBUMIN SERPL-MCNC: 4.4 G/DL (ref 3.5–5.2)
ALBUMIN/GLOB SERPL: 1.5 G/DL
ALP SERPL-CCNC: 45 U/L (ref 39–117)
ALT SERPL-CCNC: 21 U/L (ref 1–33)
AST SERPL-CCNC: 18 U/L (ref 1–32)
BILIRUB SERPL-MCNC: 0.6 MG/DL (ref 0.2–1.2)
BUN SERPL-MCNC: 16 MG/DL (ref 6–20)
BUN/CREAT SERPL: 22.2 (ref 7–25)
CALCIUM SERPL-MCNC: 9.5 MG/DL (ref 8.6–10.5)
CHLORIDE SERPL-SCNC: 102 MMOL/L (ref 98–107)
CHOLEST SERPL-MCNC: 201 MG/DL (ref 0–200)
CO2 SERPL-SCNC: 26.8 MMOL/L (ref 22–29)
CREAT SERPL-MCNC: 0.72 MG/DL (ref 0.57–1)
GLOBULIN SER CALC-MCNC: 3 GM/DL
GLUCOSE SERPL-MCNC: 105 MG/DL (ref 65–99)
HBA1C MFR BLD: 6 % (ref 4.8–5.6)
HDLC SERPL-MCNC: 62 MG/DL (ref 40–60)
LDLC SERPL CALC-MCNC: 127 MG/DL (ref 0–100)
LDLC/HDLC SERPL: 2.04 {RATIO}
POTASSIUM SERPL-SCNC: 4.4 MMOL/L (ref 3.5–5.2)
PROT SERPL-MCNC: 7.4 G/DL (ref 6–8.5)
SODIUM SERPL-SCNC: 143 MMOL/L (ref 136–145)
TRIGL SERPL-MCNC: 62 MG/DL (ref 0–150)
VLDLC SERPL CALC-MCNC: 12.4 MG/DL

## 2019-09-18 ENCOUNTER — OFFICE VISIT (OUTPATIENT)
Dept: INTERNAL MEDICINE | Facility: CLINIC | Age: 59
End: 2019-09-18

## 2019-09-18 VITALS
DIASTOLIC BLOOD PRESSURE: 80 MMHG | HEIGHT: 65 IN | WEIGHT: 196 LBS | OXYGEN SATURATION: 98 % | BODY MASS INDEX: 32.65 KG/M2 | HEART RATE: 78 BPM | SYSTOLIC BLOOD PRESSURE: 126 MMHG | TEMPERATURE: 98.2 F

## 2019-09-18 DIAGNOSIS — E78.5 HYPERLIPIDEMIA, UNSPECIFIED HYPERLIPIDEMIA TYPE: ICD-10-CM

## 2019-09-18 DIAGNOSIS — Z00.00 PHYSICAL EXAM, ANNUAL: Primary | ICD-10-CM

## 2019-09-18 DIAGNOSIS — E03.9 ACQUIRED HYPOTHYROIDISM: ICD-10-CM

## 2019-09-18 DIAGNOSIS — R73.02 IMPAIRED GLUCOSE TOLERANCE: ICD-10-CM

## 2019-09-18 PROCEDURE — 90471 IMMUNIZATION ADMIN: CPT | Performed by: FAMILY MEDICINE

## 2019-09-18 PROCEDURE — 99396 PREV VISIT EST AGE 40-64: CPT | Performed by: FAMILY MEDICINE

## 2019-09-18 PROCEDURE — 90715 TDAP VACCINE 7 YRS/> IM: CPT | Performed by: FAMILY MEDICINE

## 2019-09-18 RX ORDER — ROSUVASTATIN CALCIUM 10 MG/1
10 TABLET, COATED ORAL DAILY
Qty: 90 TABLET | Refills: 1 | Status: SHIPPED | OUTPATIENT
Start: 2019-09-18 | End: 2020-04-10

## 2019-09-18 NOTE — PATIENT INSTRUCTIONS
Exercising to Lose Weight  Exercise is structured, repetitive physical activity to improve fitness and health. Getting regular exercise is important for everyone. It is especially important if you are overweight. Being overweight increases your risk of heart disease, stroke, diabetes, high blood pressure, and several types of cancer. Reducing your calorie intake and exercising can help you lose weight.  Exercise is usually categorized as moderate or vigorous intensity. To lose weight, most people need to do a certain amount of moderate-intensity or vigorous-intensity exercise each week.  Moderate-intensity exercise    Moderate-intensity exercise is any activity that gets you moving enough to burn at least three times more energy (calories) than if you were sitting.  Examples of moderate exercise include:  · Walking a mile in 15 minutes.  · Doing light yard work.  · Biking at an easy pace.  Most people should get at least 150 minutes (2 hours and 30 minutes) a week of moderate-intensity exercise to maintain their body weight.  Vigorous-intensity exercise  Vigorous-intensity exercise is any activity that gets you moving enough to burn at least six times more calories than if you were sitting. When you exercise at this intensity, you should be working hard enough that you are not able to carry on a conversation.  Examples of vigorous exercise include:  · Running.  · Playing a team sport, such as football, basketball, and soccer.  · Jumping rope.  Most people should get at least 75 minutes (1 hour and 15 minutes) a week of vigorous-intensity exercise to maintain their body weight.  How can exercise affect me?  When you exercise enough to burn more calories than you eat, you lose weight. Exercise also reduces body fat and builds muscle. The more muscle you have, the more calories you burn. Exercise also:  · Improves mood.  · Reduces stress and tension.  · Improves your overall fitness, flexibility, and  endurance.  · Increases bone strength.  The amount of exercise you need to lose weight depends on:  · Your age.  · The type of exercise.  · Any health conditions you have.  · Your overall physical ability.  Talk to your health care provider about how much exercise you need and what types of activities are safe for you.  What actions can I take to lose weight?  Nutrition    · Make changes to your diet as told by your health care provider or diet and nutrition specialist (dietitian). This may include:  ? Eating fewer calories.  ? Eating more protein.  ? Eating less unhealthy fats.  ? Eating a diet that includes fresh fruits and vegetables, whole grains, low-fat dairy products, and lean protein.  ? Avoiding foods with added fat, salt, and sugar.  · Drink plenty of water while you exercise to prevent dehydration or heat stroke.  Activity  · Choose an activity that you enjoy and set realistic goals. Your health care provider can help you make an exercise plan that works for you.  · Exercise at a moderate or vigorous intensity most days of the week.  ? The intensity of exercise may vary from person to person. You can tell how intense a workout is for you by paying attention to your breathing and heartbeat. Most people will notice their breathing and heartbeat get faster with more intense exercise.  · Do resistance training twice each week, such as:  ? Push-ups.  ? Sit-ups.  ? Lifting weights.  ? Using resistance bands.  · Getting short amounts of exercise can be just as helpful as long structured periods of exercise. If you have trouble finding time to exercise, try to include exercise in your daily routine.  ? Get up, stretch, and walk around every 30 minutes throughout the day.  ? Go for a walk during your lunch break.  ? Park your car farther away from your destination.  ? If you take public transportation, get off one stop early and walk the rest of the way.  ? Make phone calls while standing up and walking  around.  ? Take the stairs instead of elevators or escalators.  · Wear comfortable clothes and shoes with good support.  · Do not exercise so much that you hurt yourself, feel dizzy, or get very short of breath.  Where to find more information  · [place] Department of Health and Human Services: www.hhs.gov  · Centers for Disease Control and Prevention (CDC): www.cdc.gov  Contact a health care provider:  · Before starting a new exercise program.  · If you have questions or concerns about your weight.  · If you have a medical problem that keeps you from exercising.  Get help right away if you have any of the following while exercising:  · Injury.  · Dizziness.  · Difficulty breathing or shortness of breath that does not go away when you stop exercising.  · Chest pain.  · Rapid heartbeat.  Summary  · Being overweight increases your risk of heart disease, stroke, diabetes, high blood pressure, and several types of cancer.  · Losing weight happens when you burn more calories than you eat.  · Reducing the amount of calories you eat in addition to getting regular moderate or vigorous exercise each week helps you lose weight.  This information is not intended to replace advice given to you by your health care provider. Make sure you discuss any questions you have with your health care provider.  Document Released: 01/20/2012 Document Revised: 12/31/2018 Document Reviewed: 12/31/2018  [employer] Interactive Patient Education © 2019 [employer]  Calorie Counting for Weight Loss  Calories are units of energy. Your body needs a certain amount of calories from food to keep you going throughout the day. When you eat more calories than your body needs, your body stores the extra calories as fat. When you eat fewer calories than your body needs, your body burns fat to get the energy it needs.  Calorie counting means keeping track of how many calories you eat and drink each day. Calorie counting can be helpful if you need to lose  weight. If you make sure to eat fewer calories than your body needs, you should lose weight. Ask your health care provider what a healthy weight is for you.  For calorie counting to work, you will need to eat the right number of calories in a day in order to lose a healthy amount of weight per week. A dietitian can help you determine how many calories you need in a day and will give you suggestions on how to reach your calorie goal.  · A healthy amount of weight to lose per week is usually 1-2 lb (0.5-0.9 kg). This usually means that your daily calorie intake should be reduced by 500-750 calories.  · Eating 1,200 - 1,500 calories per day can help most women lose weight.  · Eating 1,500 - 1,800 calories per day can help most men lose weight.  What is my plan?  My goal is to have __________ calories per day.  If I have this many calories per day, I should lose around __________ pounds per week.  What do I need to know about calorie counting?  In order to meet your daily calorie goal, you will need to:  · Find out how many calories are in each food you would like to eat. Try to do this before you eat.  · Decide how much of the food you plan to eat.  · Write down what you ate and how many calories it had. Doing this is called keeping a food log.  To successfully lose weight, it is important to balance calorie counting with a healthy lifestyle that includes regular activity. Aim for 150 minutes of moderate exercise (such as walking) or 75 minutes of vigorous exercise (such as running) each week.  Where do I find calorie information?    The number of calories in a food can be found on a Nutrition Facts label. If a food does not have a Nutrition Facts label, try to look up the calories online or ask your dietitian for help.  Remember that calories are listed per serving. If you choose to have more than one serving of a food, you will have to multiply the calories per serving by the amount of servings you plan to eat. For  "example, the label on a package of bread might say that a serving size is 1 slice and that there are 90 calories in a serving. If you eat 1 slice, you will have eaten 90 calories. If you eat 2 slices, you will have eaten 180 calories.  How do I keep a food log?  Immediately after each meal, record the following information in your food log:  · What you ate. Don't forget to include toppings, sauces, and other extras on the food.  · How much you ate. This can be measured in cups, ounces, or number of items.  · How many calories each food and drink had.  · The total number of calories in the meal.  Keep your food log near you, such as in a small notebook in your pocket, or use a mobile pily or website. Some programs will calculate calories for you and show you how many calories you have left for the day to meet your goal.  What are some calorie counting tips?    · Use your calories on foods and drinks that will fill you up and not leave you hungry:  ? Some examples of foods that fill you up are nuts and nut butters, vegetables, lean proteins, and high-fiber foods like whole grains. High-fiber foods are foods with more than 5 g fiber per serving.  ? Drinks such as sodas, specialty coffee drinks, alcohol, and juices have a lot of calories, yet do not fill you up.  · Eat nutritious foods and avoid empty calories. Empty calories are calories you get from foods or beverages that do not have many vitamins or protein, such as candy, sweets, and soda. It is better to have a nutritious high-calorie food (such as an avocado) than a food with few nutrients (such as a bag of chips).  · Know how many calories are in the foods you eat most often. This will help you calculate calorie counts faster.  · Pay attention to calories in drinks. Low-calorie drinks include water and unsweetened drinks.  · Pay attention to nutrition labels for \"low fat\" or \"fat free\" foods. These foods sometimes have the same amount of calories or more calories " than the full fat versions. They also often have added sugar, starch, or salt, to make up for flavor that was removed with the fat.  · Find a way of tracking calories that works for you. Get creative. Try different apps or programs if writing down calories does not work for you.  What are some portion control tips?  · Know how many calories are in a serving. This will help you know how many servings of a certain food you can have.  · Use a measuring cup to measure serving sizes. You could also try weighing out portions on a kitchen scale. With time, you will be able to estimate serving sizes for some foods.  · Take some time to put servings of different foods on your favorite plates, bowls, and cups so you know what a serving looks like.  · Try not to eat straight from a bag or box. Doing this can lead to overeating. Put the amount you would like to eat in a cup or on a plate to make sure you are eating the right portion.  · Use smaller plates, glasses, and bowls to prevent overeating.  · Try not to multitask (for example, watch TV or use your computer) while eating. If it is time to eat, sit down at a table and enjoy your food. This will help you to know when you are full. It will also help you to be aware of what you are eating and how much you are eating.  What are tips for following this plan?  Reading food labels  · Check the calorie count compared to the serving size. The serving size may be smaller than what you are used to eating.  · Check the source of the calories. Make sure the food you are eating is high in vitamins and protein and low in saturated and trans fats.  Shopping  · Read nutrition labels while you shop. This will help you make healthy decisions before you decide to purchase your food.  · Make a grocery list and stick to it.  Cooking  · Try to cook your favorite foods in a healthier way. For example, try baking instead of frying.  · Use low-fat dairy products.  Meal planning  · Use more fruits  and vegetables. Half of your plate should be fruits and vegetables.  · Include lean proteins like poultry and fish.  How do I count calories when eating out?  · Ask for smaller portion sizes.  · Consider sharing an entree and sides instead of getting your own entree.  · If you get your own entree, eat only half. Ask for a box at the beginning of your meal and put the rest of your entree in it so you are not tempted to eat it.  · If calories are listed on the menu, choose the lower calorie options.  · Choose dishes that include vegetables, fruits, whole grains, low-fat dairy products, and lean protein.  · Choose items that are boiled, broiled, grilled, or steamed. Stay away from items that are buttered, battered, fried, or served with cream sauce. Items labeled “crispy” are usually fried, unless stated otherwise.  · Choose water, low-fat milk, unsweetened iced tea, or other drinks without added sugar. If you want an alcoholic beverage, choose a lower calorie option such as a glass of wine or light beer.  · Ask for dressings, sauces, and syrups on the side. These are usually high in calories, so you should limit the amount you eat.  · If you want a salad, choose a garden salad and ask for grilled meats. Avoid extra toppings like lipscomb, cheese, or fried items. Ask for the dressing on the side, or ask for olive oil and vinegar or lemon to use as dressing.  · Estimate how many servings of a food you are given. For example, a serving of cooked rice is ½ cup or about the size of half a baseball. Knowing serving sizes will help you be aware of how much food you are eating at restaurants. The list below tells you how big or small some common portion sizes are based on everyday objects:  ? 1 oz--4 stacked dice.  ? 3 oz--1 deck of cards.  ? 1 tsp--1 die.  ? 1 Tbsp--½ a ping-pong ball.  ? 2 Tbsp--1 ping-pong ball.  ? ½ cup--½ baseball.  ? 1 cup--1 baseball.  Summary  · Calorie counting means keeping track of how many calories  you eat and drink each day. If you eat fewer calories than your body needs, you should lose weight.  · A healthy amount of weight to lose per week is usually 1-2 lb (0.5-0.9 kg). This usually means reducing your daily calorie intake by 500-750 calories.  · The number of calories in a food can be found on a Nutrition Facts label. If a food does not have a Nutrition Facts label, try to look up the calories online or ask your dietitian for help.  · Use your calories on foods and drinks that will fill you up, and not on foods and drinks that will leave you hungry.  · Use smaller plates, glasses, and bowls to prevent overeating.  This information is not intended to replace advice given to you by your health care provider. Make sure you discuss any questions you have with your health care provider.  Document Released: 12/18/2006 Document Revised: 11/17/2017 Document Reviewed: 11/17/2017  [employer] Interactive Patient Education © 2019 [employer]

## 2019-09-18 NOTE — PROGRESS NOTES
Leo Elliott is a 58 y.o. female.   Chief Complaint   Patient presents with   • Annual Exam       History of Present Illness     #1 CPE-patient is here for complete physical exam without GYN evaluation.  She has no complaints.  Patient's 24 years old daughter was diagnosed in March with a rare form of a ASPS sarcoma.  She has small lesions in the lungs.  She is in clinical trial at [facility].  Patient is on no new prescription medications.  She does not take over-the-counter medications.  She is not allergic to any medications.  She does not smoke cigarettes.  She does not drink alcohol.  No drugs.  She does not exercise regularly.  She has dental appointments every 6 months.  Last eye exam was in March 2019.  She wears glasses.  She had tetanus vaccine more than 10 years ago.  She saw her GYN yesterday.  She had Pap smear, pelvic exam, breast and rectal exam.  DEXA results are pending.  She had mammogram in March 2019.  Colonoscopy 2017 and next was recommended in 2022.    Hyperlipidemia-patient is on rosuvastatin 10 mg a day.  She takes it every day.  She has no side effects.  No muscle aches, muscle cramps.  LDL is at 127, it is at 62.  LFTs normal.  Impaired fasting glucose-A1c at 6.045.8.  Fasting blood sugar is 105.    The following portions of the patient's history were reviewed and updated as appropriate: allergies, current medications, past family history, past medical history, past social history, past surgical history and problem list.    Review of Systems   Constitutional: Negative.    HENT: Positive for rhinorrhea.    Respiratory: Negative.    Cardiovascular: Negative.    Gastrointestinal: Negative for blood in stool.   Genitourinary: Negative for hematuria.   Neurological: Negative.          Objective   Wt Readings from Last 3 Encounters:   09/18/19 88.9 kg (196 lb)   02/19/19 89.4 kg (197 lb)   05/02/18 91.8 kg (202 lb 6.4 oz)      Vitals:    09/18/19 1336   BP: 126/80   Pulse: 78   Temp:  98.2 °F (36.8 °C)   SpO2: 98%     Temp Readings from Last 3 Encounters:   09/18/19 98.2 °F (36.8 °C)   02/19/19 97.9 °F (36.6 °C)   05/02/18 96.3 °F (35.7 °C) (Tympanic)     BP Readings from Last 3 Encounters:   09/18/19 126/80   02/19/19 130/68   05/02/18 150/84     Pulse Readings from Last 3 Encounters:   09/18/19 78   02/19/19 78   05/02/18 67       Physical Exam   Constitutional: She is oriented to person, place, and time. She appears well-developed and well-nourished. No distress.   HENT:   Head: Normocephalic and atraumatic. Hair is normal.   Right Ear: Hearing, tympanic membrane, external ear and ear canal normal. No drainage. No decreased hearing is noted.   Left Ear: Hearing, tympanic membrane, external ear and ear canal normal. No decreased hearing is noted.   Nose: No nasal deformity.   Mouth/Throat: Oropharynx is clear and moist.   Eyes: Conjunctivae, EOM and lids are normal. Pupils are equal, round, and reactive to light. Right eye exhibits no discharge. Left eye exhibits no discharge.   Neck: Normal range of motion. Neck supple. No JVD present. Carotid bruit is not present. No tracheal deviation present. No thyromegaly present.   Cardiovascular: Normal rate, regular rhythm, normal heart sounds, intact distal pulses and normal pulses. Exam reveals no gallop and no friction rub.   No murmur heard.  Pulmonary/Chest: Effort normal and breath sounds normal. No respiratory distress. She has no wheezes. She has no rales. She exhibits no tenderness.   Abdominal: Soft. She exhibits no distension and no mass. There is no tenderness. There is no rebound and no guarding. No hernia.   Musculoskeletal: Normal range of motion. She exhibits no edema, tenderness or deformity.   Lymphadenopathy:     She has no cervical adenopathy.   Neurological: She is alert and oriented to person, place, and time. She has normal reflexes. She displays normal reflexes. No cranial nerve deficit. She exhibits normal muscle tone.  Coordination normal.   Skin: Skin is warm, dry and intact. No rash noted. She is not diaphoretic. No erythema.   Psychiatric: She has a normal mood and affect. Her behavior is normal. Judgment and thought content normal.   Vitals reviewed.      Assessment/Plan   Bhavan was seen today for annual exam.    Diagnoses and all orders for this visit:    Physical exam, annual    Hyperlipidemia, unspecified hyperlipidemia type  -     Hemoglobin A1c; Future  -     Basic Metabolic Panel; Future  -     Lipid Panel With LDL / HDL Ratio; Future    Impaired glucose tolerance  -     Hemoglobin A1c; Future  -     Basic Metabolic Panel; Future  -     Lipid Panel With LDL / HDL Ratio; Future    Acquired hypothyroidism  -     Thyroid Cascade Profile; Future    Other orders  -     rosuvastatin (CRESTOR) 10 MG tablet; Take 1 tablet by mouth Daily.        #1 CPE-preventive care performed.  Patient is advised to start regular exercise at least 30 minutes a day, 5 days a week.  Information on calorie count and exercise to lose weight provided.  She gets tetanus vaccine today.  Flu vaccine declined.  She is advised to use sunscreen and to reapply it every 2 hours.  She sees dermatologist annually and is going to continue it.  She is up-to-date with a dental appointments and is going to continue it.  I am advising counseling.  Patient is under significant stress due to the recent diagnosis of sarcoma in her daughter.  Blood work results were reviewed with patient.    2.  Hyperlipidemia-continue current treatment.  Follow-up in 6 months.  3.  Impaired fasting glucose- weight loss can decrease risk of developing diabetes.  Try calorie count and exercise.  If it is not effective in 3 months, consider joining weight watchers.  Labs in 6 months before office visit.

## 2019-10-11 ENCOUNTER — OFFICE VISIT (OUTPATIENT)
Dept: INTERNAL MEDICINE | Facility: CLINIC | Age: 59
End: 2019-10-11

## 2019-10-11 VITALS
HEIGHT: 65 IN | TEMPERATURE: 98.1 F | BODY MASS INDEX: 32.07 KG/M2 | DIASTOLIC BLOOD PRESSURE: 78 MMHG | OXYGEN SATURATION: 98 % | HEART RATE: 75 BPM | WEIGHT: 192.5 LBS | SYSTOLIC BLOOD PRESSURE: 110 MMHG

## 2019-10-11 DIAGNOSIS — R10.11 RIGHT UPPER QUADRANT ABDOMINAL PAIN: Primary | ICD-10-CM

## 2019-10-11 PROCEDURE — 99213 OFFICE O/P EST LOW 20 MIN: CPT | Performed by: FAMILY MEDICINE

## 2019-10-11 NOTE — PROGRESS NOTES
Subjective   Bhavna Elliott is a 58 y.o. female.   Chief Complaint   Patient presents with   • Abdominal Pain       History of Present Illness     #1  Abdominal pain- started about 2 weeks ago when patient was in the conference.  Her diet was not as good as usualy.  She had a right upper quadrant abdominal pain, but also she had epigastric pain.  She described pain as sharp, on and off, intensity from 5-8 out of 10.  She also had belching.  No nausea, no vomiting.  No change in bowel movement pattern.  She is not sure if it was related to meals or any specific food.  She changed her diet to salads and it helped.  She is concerned about the gallbladder.    She was recently diagnosed with uterine polyp and is expecting saline ultrasound next week.    The following portions of the patient's history were reviewed and updated as appropriate: allergies, current medications, past medical history, past social history and problem list.    Review of Systems   Constitutional: Negative.    Respiratory: Negative.    Cardiovascular: Negative.    Gastrointestinal: Positive for abdominal distention and abdominal pain. Negative for blood in stool, diarrhea, nausea and vomiting.         Objective   Wt Readings from Last 3 Encounters:   10/11/19 87.3 kg (192 lb 8 oz)   09/18/19 88.9 kg (196 lb)   02/19/19 89.4 kg (197 lb)      Vitals:    10/11/19 1103   BP: 110/78   Pulse: 75   Temp: 98.1 °F (36.7 °C)   SpO2: 98%     Temp Readings from Last 3 Encounters:   10/11/19 98.1 °F (36.7 °C)   09/18/19 98.2 °F (36.8 °C)   02/19/19 97.9 °F (36.6 °C)     BP Readings from Last 3 Encounters:   10/11/19 110/78   09/18/19 126/80   02/19/19 130/68     Pulse Readings from Last 3 Encounters:   10/11/19 75   09/18/19 78   02/19/19 78       Physical Exam   Constitutional: She is oriented to person, place, and time. She appears well-developed and well-nourished.   HENT:   Head: Normocephalic and atraumatic.   Neck: Neck supple.   Cardiovascular: Normal  rate, regular rhythm and normal heart sounds.   Pulmonary/Chest: Effort normal and breath sounds normal.   Abdominal: Soft. Bowel sounds are normal. She exhibits no distension and no mass. There is no tenderness. There is no guarding.   Neurological: She is alert and oriented to person, place, and time.   Skin: Skin is warm, dry and intact.       Assessment/Plan   Bhavna was seen today for abdominal pain.    Diagnoses and all orders for this visit:    Right upper quadrant abdominal pain        #1 abdominal pain- patient will start Pepcid or Prilosec for 2 weeks.  She will avoid caffeine, fatty foods and alcohol.  We are ordering gallbladder ultrasound.  If she continues to be symptomatic and ultrasound is negative we will proceed with HIDA scan.  Follow-up as needed.

## 2019-11-22 PROBLEM — R13.10 DYSPHAGIA: Status: ACTIVE | Noted: 2019-11-22

## 2019-11-22 NOTE — PROGRESS NOTES
SURGERY  Bhavna Elliott   1960 11/25/2019  Chief Complaint: Dysphagia    HPI    Ms. Elliott is a nice 59 y.o. female who comes in with difficulty swallowing.  She having a pretty rough time recently.  She is the sister of Christina Freddie, our great nurse in preop holding.  Ms. Atwood is actually scheduled for a hysterectomy tomorrow, for endometrial cancer.    She is here because she developed dysphasia.  This was not very significant, but then when she was at [facility] on October 27, she was eating chicken, and essentially got a food bolus.  Her dad has the same issue.  They quickly left [facility] in relieving but she was having such a hard time she could not make it to [facility] and instead she went to [facility].  They took her to endoscopy where Dr. Benja Albright did a food impaction reduction.  He noticed that there was some small tears at the esophageal inlet, that were thought to be minor and would be self-limited.  They started her on Protonix and recommended dilatation in 4 weeks.  There is a position after looking was that the impaction was due to inflammation at the site not a defined stricture.    Again she says she is had prior episodes of this but nothing to that extent, where she can get the food to gone down.  She also brings up that she has difficulty with anesthetic, saying that she got succinylcholine at the time of her EGD and the day after she was discharged when she awakened at home she was paralyzed when she awakened.  I have instructed her to be very careful that she tells the anesthesiologist this before she has any sort of surgery.    She did have a screening colonoscopy by me in 2017 with the findings of a small hyperplastic adenomatous polyp.  She does have a history of endometrial cancer, seen by Dr. Menjivar with plans for surgery as noted above.      Past Medical History:   Diagnosis Date   • Colon polyp    • Disease of thyroid gland    • Endometrial adenocarcinoma (CMS/HCC)    • GERD  (gastroesophageal reflux disease)    • Hyperlipidemia    • Hypothyroidism      Past Surgical History:   Procedure Laterality Date   • COLONOSCOPY N/A 11/20/2017    Procedure: COLONOSCOPY TO CECUM  WITH COLD BIOPSY POLYPECTOMY;  Surgeon: Nidia Saldana MD;  Location: [facility] ENDOSCOPY;  Service:    • COLONOSCOPY W/ POLYPECTOMY N/A 07/10/2012    Hyperplastic sigmoid poylp-Dr. Nidia Saldana   • D&C HYSTEROSCOPY N/A 11/13/2019    Dr. Jose Perkins   • WISDOM TOOTH EXTRACTION       Family History   Problem Relation Age of Onset   • Hyperlipidemia Mother    • Alzheimer's disease Father    • Cancer Daughter         Sarcoma     Social History     Socioeconomic History   • Marital status:      Spouse name: Oscar   • Number of children: 2   • Years of education: College   • Highest education level: Not on file   Occupational History   • Occupation: Computer Tech     Employer: [employer]   Tobacco Use   • Smoking status: Never Smoker   • Smokeless tobacco: Never Used   Substance and Sexual Activity   • Alcohol use: No     Frequency: Never     Comment: Socially   • Drug use: No   • Sexual activity: Defer         Current Outpatient Medications:   •  levothyroxine (SYNTHROID, LEVOTHROID) 112 MCG tablet, TAKE 1 TABLET BY MOUTH EVERY DAY FOR THYROID, Disp: 90 tablet, Rfl: 2  •  pantoprazole (PROTONIX) 40 MG EC tablet, Take 1 tablet by mouth Daily., Disp: 90 tablet, Rfl: 0  •  rosuvastatin (CRESTOR) 10 MG tablet, Take 1 tablet by mouth Daily., Disp: 90 tablet, Rfl: 1  •  pantoprazole (PROTONIX) 40 MG EC tablet, Take 1 tablet by mouth Daily., Disp: 30 tablet, Rfl: 1    Allergies   Allergen Reactions   • Succinylcholine Other (See Comments)     Woke up paralyzed     Review of Systems  Positive for abdominal pain, diarrhea, rectal pain, vaginal bleeding, vaginal discharge, nervousness, sleep disturbance, anxiety  All other systems reviewed and negative.    Vitals:    11/25/19 1520   Pulse: 69   SpO2: 97%   Weight: 87.8 kg (193 lb  "9.6 oz)   Height: 165.1 cm (65\")       PHYSICAL EXAM:    Pulse 69   Ht 165.1 cm (65\")   Wt 87.8 kg (193 lb 9.6 oz)   SpO2 97%   BMI 32.22 kg/m²   Body mass index is 32.22 kg/m².    Constitutional: well developed, well nourished, appears  healthy, stated age  Eyes: sclera nonicteric, conjunctiva not injected   ENMT: Hearing intact, trachea midline, dentitia in good order  CVS: RRR, no murmur, peripheral edema not present  Respiratory: CTA, normal respiratory effort   Gastrointestinal: no hepatosplenomegaly, abdomen soft, nontender, abdominal hernia not present  Genitourinary: inguinal hernia present  Musculoskeletal: gait normal, muscle mass normal  Skin: warm and dry, no rashes visible  Neurological: awake and alert, seems to have reasonable capacity for understanding for medical decision making  Psychiatric: appears to have reasonable judgement, pleasant  Lymphatics: no cervical adenopathy    Radiographic/Lab Findings: Labs on 1119 with a BUN of 8, creatinine of 0.6.    Pamphlet reviewed: None    IMPRESSION:  · History of food impaction, likely result of esophagitis, with symptoms of dysphagia improved with Protonix 40 mg  · Endometrial cancer with planned hysterectomy  · Symptoms of paralysis almost 24 hours after surgical intervention with succinylcholine  · Father with similar problems of esophageal stricture  · Hypothyroidism on Synthroid    PLAN:  · EGD after the first the year.  I suggested that she stay on her Protonix and I gave her several month supply.  I described to her that if the issue was inflammation, that this will likely improve in the interim and when we do the EGD there will be no findings and her symptoms will completely disappeared.  On the other hand if it is a firm stenotic area from prior scarring, it will not improve with the Protonix necessarily and we may have to dilated.  That she is already scheduled for dilatation 2.  · Colonoscopy for screening at the same time.    Nidia Saldana, " MD  11/25/2019

## 2019-11-25 ENCOUNTER — OFFICE VISIT (OUTPATIENT)
Dept: SURGERY | Facility: CLINIC | Age: 59
End: 2019-11-25

## 2019-11-25 ENCOUNTER — PREP FOR SURGERY (OUTPATIENT)
Dept: OTHER | Facility: HOSPITAL | Age: 59
End: 2019-11-25

## 2019-11-25 VITALS — HEIGHT: 65 IN | OXYGEN SATURATION: 97 % | HEART RATE: 69 BPM | WEIGHT: 193.6 LBS | BODY MASS INDEX: 32.26 KG/M2

## 2019-11-25 DIAGNOSIS — R10.31 BILATERAL LOWER ABDOMINAL PAIN: ICD-10-CM

## 2019-11-25 DIAGNOSIS — R10.32 BILATERAL LOWER ABDOMINAL PAIN: ICD-10-CM

## 2019-11-25 DIAGNOSIS — R13.10 DYSPHAGIA, UNSPECIFIED TYPE: Primary | ICD-10-CM

## 2019-11-25 PROCEDURE — 99203 OFFICE O/P NEW LOW 30 MIN: CPT | Performed by: SURGERY

## 2019-11-25 RX ORDER — PANTOPRAZOLE SODIUM 40 MG/1
40 TABLET, DELAYED RELEASE ORAL DAILY
Qty: 90 TABLET | Refills: 0 | Status: SHIPPED | OUTPATIENT
Start: 2019-11-25 | End: 2020-03-22

## 2019-11-25 RX ORDER — PANTOPRAZOLE SODIUM 40 MG/1
40 TABLET, DELAYED RELEASE ORAL DAILY
Refills: 0 | COMMUNITY
Start: 2019-10-27 | End: 2019-11-25 | Stop reason: SDUPTHER

## 2019-11-25 RX ORDER — PANTOPRAZOLE SODIUM 40 MG/1
40 TABLET, DELAYED RELEASE ORAL DAILY
Qty: 30 TABLET | Refills: 1 | Status: SHIPPED | OUTPATIENT
Start: 2019-11-25 | End: 2020-06-22

## 2020-01-13 RX ORDER — LEVOTHYROXINE SODIUM 112 UG/1
TABLET ORAL
Qty: 90 TABLET | Refills: 2 | Status: SHIPPED | OUTPATIENT
Start: 2020-01-13 | End: 2020-10-16

## 2020-03-02 ENCOUNTER — TRANSCRIBE ORDERS (OUTPATIENT)
Dept: ADMINISTRATIVE | Facility: HOSPITAL | Age: 60
End: 2020-03-02

## 2020-03-02 DIAGNOSIS — Z12.31 SCREENING MAMMOGRAM, ENCOUNTER FOR: Primary | ICD-10-CM

## 2020-03-12 DIAGNOSIS — E03.9 ACQUIRED HYPOTHYROIDISM: ICD-10-CM

## 2020-03-12 DIAGNOSIS — E78.5 HYPERLIPIDEMIA, UNSPECIFIED HYPERLIPIDEMIA TYPE: ICD-10-CM

## 2020-03-12 DIAGNOSIS — R73.02 IMPAIRED GLUCOSE TOLERANCE: ICD-10-CM

## 2020-03-17 ENCOUNTER — APPOINTMENT (OUTPATIENT)
Dept: MAMMOGRAPHY | Facility: HOSPITAL | Age: 60
End: 2020-03-17

## 2020-03-20 ENCOUNTER — TELEPHONE (OUTPATIENT)
Dept: INTERNAL MEDICINE | Facility: CLINIC | Age: 60
End: 2020-03-20

## 2020-03-20 NOTE — TELEPHONE ENCOUNTER
I called the patient to get some more information. I left a voicemail for her to call us back since I cannot technically speak with 'Gato' from [employer].

## 2020-03-20 NOTE — TELEPHONE ENCOUNTER
----- Message from Suki Darden MD sent at 3/20/2020  1:56 PM EDT -----  Regarding: RE: ORDER REQUEST  Please talk to patient.  If she wants to do it we can refer her for prediabetes.  She does not have a diagnosis of diabetes.  ----- Message -----  From: Wilda Larsen MA  Sent: 3/20/2020   6:39 AM EDT  To: Suki Darden MD  Subject: FW: ORDER REQUEST                                Please advise if this is okay or if you want to see her first. Pt was last seen for DM in September 2019. She is scheduled with you late April to follow up.     ----- Message -----  From: Georgette Oliveros  Sent: 3/19/2020   1:19 PM EDT  To: Toshia Earl MA  Subject: ORDER REQUEST                                    Gato Bailey with [employer] is asking for an order to be put in for diabetes education/nutrition counseling for this patient. Her company is contracted with [employer] to work with their members. If you have questions, Gato can be reached at [phone]. Thanks

## 2020-03-22 RX ORDER — PANTOPRAZOLE SODIUM 40 MG/1
TABLET, DELAYED RELEASE ORAL
Qty: 90 TABLET | Refills: 0 | Status: SHIPPED | OUTPATIENT
Start: 2020-03-22 | End: 2020-04-28 | Stop reason: SDUPTHER

## 2020-04-10 RX ORDER — ROSUVASTATIN CALCIUM 10 MG/1
TABLET, COATED ORAL
Qty: 90 TABLET | Refills: 0 | Status: SHIPPED | OUTPATIENT
Start: 2020-04-10 | End: 2020-04-28 | Stop reason: SDUPTHER

## 2020-04-28 ENCOUNTER — TELEMEDICINE (OUTPATIENT)
Dept: INTERNAL MEDICINE | Facility: CLINIC | Age: 60
End: 2020-04-28

## 2020-04-28 ENCOUNTER — APPOINTMENT (OUTPATIENT)
Dept: MAMMOGRAPHY | Facility: HOSPITAL | Age: 60
End: 2020-04-28

## 2020-04-28 VITALS — BODY MASS INDEX: 31.62 KG/M2 | WEIGHT: 190 LBS

## 2020-04-28 DIAGNOSIS — C54.1 ENDOMETRIAL ADENOCARCINOMA (HCC): ICD-10-CM

## 2020-04-28 DIAGNOSIS — R73.02 IMPAIRED GLUCOSE TOLERANCE: ICD-10-CM

## 2020-04-28 DIAGNOSIS — E78.5 HYPERLIPIDEMIA, UNSPECIFIED HYPERLIPIDEMIA TYPE: Primary | ICD-10-CM

## 2020-04-28 DIAGNOSIS — E03.9 ACQUIRED HYPOTHYROIDISM: ICD-10-CM

## 2020-04-28 PROBLEM — K21.9 GERD (GASTROESOPHAGEAL REFLUX DISEASE): Status: ACTIVE | Noted: 2019-10-27

## 2020-04-28 PROCEDURE — 99214 OFFICE O/P EST MOD 30 MIN: CPT | Performed by: FAMILY MEDICINE

## 2020-04-28 RX ORDER — ROSUVASTATIN CALCIUM 10 MG/1
10 TABLET, COATED ORAL DAILY
Qty: 90 TABLET | Refills: 1 | Status: SHIPPED | OUTPATIENT
Start: 2020-04-28 | End: 2020-10-28 | Stop reason: SDUPTHER

## 2020-04-28 NOTE — PROGRESS NOTES
"Leo Elliott is a 59 y.o. female.   Chief Complaint   Patient presents with   • Hyperlipidemia   • Hypothyroidism   • Hyperglycemia       History of Present Illness     Video visit.    #1 hyperlipidemia-diagnosed years ago.  Patient is on Crestor 10 mg a day. She takes it everyday.  No muscle aches, no muscle cramps.  She does not smoke cigarettes.  She never did.        #2 impaired fasting glucose- weight is stable. No family history of diabetes.     #3 hypothyroidism-patient has no history of thyroid surgery.  She is on levothyroxine at 112 µg a day.  She takes everyday on empty stomach and does not eat for at least 30 minutes after taking it.    #4 endometrial adenocarcinoma- patient was diagnosed with endometrial adenocarcinoma stage Ia in fall 2019.  She underwent hysterectomy and radiotherapy which she completed in February.  She is scheduled for video visit follow-up with her oncologist today.    She was started on Protonix for GERD by Dr. Saldana.  It helped a lot.  She says it is a \"miracle drug\".      The following portions of the patient's history were reviewed and updated as appropriate: allergies, current medications, past family history, past medical history, past social history, past surgical history and problem list.    Review of Systems   Constitutional: Negative for fever.   Respiratory: Negative for cough and shortness of breath.    Cardiovascular: Negative for chest pain.   Musculoskeletal: Negative for myalgias.   Psychiatric/Behavioral: Negative.          Objective   Wt Readings from Last 3 Encounters:   04/28/20 86.2 kg (190 lb)   11/25/19 87.8 kg (193 lb 9.6 oz)   10/11/19 87.3 kg (192 lb 8 oz)    There were no vitals filed for this visit.  Temp Readings from Last 3 Encounters:   10/11/19 98.1 °F (36.7 °C)   09/18/19 98.2 °F (36.8 °C)   02/19/19 97.9 °F (36.6 °C)     BP Readings from Last 3 Encounters:   10/11/19 110/78   09/18/19 126/80   02/19/19 130/68     Pulse Readings " from Last 3 Encounters:   11/25/19 69   10/11/19 75   09/18/19 78     Body mass index is 31.62 kg/m².    Physical Exam   Constitutional: She is oriented to person, place, and time. She appears well-developed and well-nourished.   Pulmonary/Chest: Effort normal.   Neurological: She is alert and oriented to person, place, and time.   Psychiatric: She has a normal mood and affect.       Assessment/Plan   Bhavna was seen today for hyperlipidemia, hypothyroidism and hyperglycemia.    Diagnoses and all orders for this visit:    Hyperlipidemia, unspecified hyperlipidemia type    Impaired glucose tolerance    Acquired hypothyroidism    Endometrial adenocarcinoma (CMS/[facility])        #1 hyperlipidemia-check labs in 1 month.  Follow-up in 6 months.  2.  Impaired fasting glucose-weight loss can decrease risk of developing diabetes.  Patient follows up with nutritionist via phone which is provided by her insurance.  It helps.  She decreased amount of carbs in her diet and portion size.  She will continue to follow-up with them.  Follow-up with us in 6 months.  3.  Hypothyroidism-check labs in 1 month.  Follow-up in 6 to 12 months.  4.  Endometrial cancer-followed by GYN/oncology.

## 2020-06-18 ENCOUNTER — APPOINTMENT (OUTPATIENT)
Dept: MAMMOGRAPHY | Facility: HOSPITAL | Age: 60
End: 2020-06-18

## 2020-06-22 RX ORDER — PANTOPRAZOLE SODIUM 40 MG/1
TABLET, DELAYED RELEASE ORAL
Qty: 90 TABLET | Refills: 0 | Status: SHIPPED | OUTPATIENT
Start: 2020-06-22 | End: 2020-09-21

## 2020-08-10 ENCOUNTER — HOSPITAL ENCOUNTER (OUTPATIENT)
Dept: MAMMOGRAPHY | Facility: HOSPITAL | Age: 60
Discharge: HOME | End: 2020-08-10
Admitting: FAMILY MEDICINE

## 2020-08-10 DIAGNOSIS — Z12.31 SCREENING MAMMOGRAM, ENCOUNTER FOR: ICD-10-CM

## 2020-08-10 PROCEDURE — 77063 BREAST TOMOSYNTHESIS BI: CPT

## 2020-08-10 PROCEDURE — 77067 SCR MAMMO BI INCL CAD: CPT

## 2020-09-21 RX ORDER — PANTOPRAZOLE SODIUM 40 MG/1
TABLET, DELAYED RELEASE ORAL
Qty: 90 TABLET | Refills: 0 | Status: SHIPPED | OUTPATIENT
Start: 2020-09-21 | End: 2021-02-15

## 2020-10-16 RX ORDER — LEVOTHYROXINE SODIUM 112 UG/1
TABLET ORAL
Qty: 90 TABLET | Refills: 0 | Status: SHIPPED | OUTPATIENT
Start: 2020-10-16 | End: 2020-10-28 | Stop reason: SDUPTHER

## 2020-10-22 LAB
BUN SERPL-MCNC: 10 MG/DL (ref 8–23)
BUN/CREAT SERPL: 12.5 (ref 7–25)
CALCIUM SERPL-MCNC: 10 MG/DL (ref 8.6–10.5)
CHLORIDE SERPL-SCNC: 103 MMOL/L (ref 98–107)
CHOLEST SERPL-MCNC: 193 MG/DL (ref 0–200)
CO2 SERPL-SCNC: 26.4 MMOL/L (ref 22–29)
CREAT SERPL-MCNC: 0.8 MG/DL (ref 0.57–1)
GLUCOSE SERPL-MCNC: 95 MG/DL (ref 65–99)
HBA1C MFR BLD: 5.7 % (ref 4.8–5.6)
HDLC SERPL-MCNC: 62 MG/DL (ref 40–60)
LDLC SERPL CALC-MCNC: 114 MG/DL (ref 0–100)
LDLC/HDLC SERPL: 1.81 {RATIO}
POTASSIUM SERPL-SCNC: 4.3 MMOL/L (ref 3.5–5.2)
SODIUM SERPL-SCNC: 142 MMOL/L (ref 136–145)
TRIGL SERPL-MCNC: 95 MG/DL (ref 0–150)
TSH SERPL DL<=0.005 MIU/L-ACNC: 1.17 UIU/ML (ref 0.45–4.5)
VLDLC SERPL CALC-MCNC: 17 MG/DL (ref 5–40)

## 2020-10-28 ENCOUNTER — OFFICE VISIT (OUTPATIENT)
Dept: INTERNAL MEDICINE | Facility: CLINIC | Age: 60
End: 2020-10-28

## 2020-10-28 VITALS
WEIGHT: 194 LBS | RESPIRATION RATE: 16 BRPM | SYSTOLIC BLOOD PRESSURE: 138 MMHG | OXYGEN SATURATION: 92 % | BODY MASS INDEX: 32.32 KG/M2 | HEIGHT: 65 IN | DIASTOLIC BLOOD PRESSURE: 60 MMHG | TEMPERATURE: 98 F | HEART RATE: 82 BPM

## 2020-10-28 DIAGNOSIS — E03.9 ACQUIRED HYPOTHYROIDISM: ICD-10-CM

## 2020-10-28 DIAGNOSIS — E78.5 HYPERLIPIDEMIA, UNSPECIFIED HYPERLIPIDEMIA TYPE: Primary | ICD-10-CM

## 2020-10-28 DIAGNOSIS — R73.02 IMPAIRED GLUCOSE TOLERANCE: ICD-10-CM

## 2020-10-28 DIAGNOSIS — Z23 NEED FOR INFLUENZA VACCINATION: ICD-10-CM

## 2020-10-28 PROCEDURE — 90471 IMMUNIZATION ADMIN: CPT | Performed by: FAMILY MEDICINE

## 2020-10-28 PROCEDURE — 90686 IIV4 VACC NO PRSV 0.5 ML IM: CPT | Performed by: FAMILY MEDICINE

## 2020-10-28 PROCEDURE — 99214 OFFICE O/P EST MOD 30 MIN: CPT | Performed by: FAMILY MEDICINE

## 2020-10-28 RX ORDER — ROSUVASTATIN CALCIUM 10 MG/1
10 TABLET, COATED ORAL DAILY
Qty: 90 TABLET | Refills: 1 | Status: SHIPPED | OUTPATIENT
Start: 2020-10-28 | End: 2021-05-28

## 2020-10-28 RX ORDER — LEVOTHYROXINE SODIUM 112 UG/1
112 TABLET ORAL DAILY
Qty: 90 TABLET | Refills: 3 | Status: SHIPPED | OUTPATIENT
Start: 2020-10-28 | End: 2022-01-10

## 2020-10-28 NOTE — PROGRESS NOTES
Leo Elliott is a 59 y.o. female.   Chief Complaint   Patient presents with   • Hypothyroidism   • Hyperglycemia   • Hyperlipidemia       History of Present Illness     #1 hyperlipidemia-diagnosed years ago.  Patient is on Crestor 10 mg a day. She takes it everyday.  No muscle aches, no muscle cramps.  , HDL 62.  She walks daily for 30 to 40 minutes.  She does not use tobacco products.  She never did.       #2 impaired fasting glucose- weight is stable. No family history of diabetes.  Fasting blood sugar 95, A1c of 5.7 from 6.0.  She eats more plant-based diet.  And she is more physically active.     #3 hypothyroidism-patient has no history of thyroid surgery.  She is on levothyroxine at 112 µg a day.  She takes it everyday on empty stomach and does not eat for 60 minutes after taking it.  TSH is normal at 1.17.    The following portions of the patient's history were reviewed and updated as appropriate: allergies, current medications, past medical history, past social history and problem list.    Review of Systems   Constitutional: Negative for chills and fever.   Respiratory: Negative for cough and shortness of breath.    Cardiovascular: Negative for chest pain.   Psychiatric/Behavioral: Negative.          Objective   Wt Readings from Last 3 Encounters:   10/28/20 88 kg (194 lb)   04/28/20 86.2 kg (190 lb)   11/25/19 87.8 kg (193 lb 9.6 oz)      Vitals:    10/28/20 1510   BP: 138/60   Pulse: 82   Resp: 16   Temp: 98 °F (36.7 °C)   SpO2: 92%     Temp Readings from Last 3 Encounters:   10/28/20 98 °F (36.7 °C)   10/11/19 98.1 °F (36.7 °C)   09/18/19 98.2 °F (36.8 °C)     BP Readings from Last 3 Encounters:   10/28/20 138/60   10/11/19 110/78   09/18/19 126/80     Pulse Readings from Last 3 Encounters:   10/28/20 82   11/25/19 69   10/11/19 75     Body mass index is 32.28 kg/m².    Physical Exam  Constitutional:       Appearance: She is well-developed. She is obese.   HENT:      Head:  Normocephalic and atraumatic.   Neck:      Musculoskeletal: Neck supple.      Thyroid: No thyromegaly.      Vascular: No carotid bruit.   Cardiovascular:      Rate and Rhythm: Normal rate and regular rhythm.      Heart sounds: Normal heart sounds.   Pulmonary:      Effort: Pulmonary effort is normal.      Breath sounds: Normal breath sounds.   Skin:     General: Skin is warm and dry.   Neurological:      Mental Status: She is alert and oriented to person, place, and time.   Psychiatric:         Behavior: Behavior normal.         Assessment/Plan   Diagnoses and all orders for this visit:    1. Hyperlipidemia, unspecified hyperlipidemia type (Primary)    2. Impaired glucose tolerance  -     Basic Metabolic Panel; Future  -     Hemoglobin A1c; Future    3. Acquired hypothyroidism    Other orders  -     levothyroxine (SYNTHROID, LEVOTHROID) 112 MCG tablet; Take 1 tablet by mouth Daily.  Dispense: 90 tablet; Refill: 3  -     rosuvastatin (CRESTOR) 10 MG tablet; Take 1 tablet by mouth Daily.  Dispense: 90 tablet; Refill: 1        #1 hyperlipidemia-continue current treatment.  Follow-up in 6 months.  2.  Impaired fasting glucose-better.  Continue to work on it.  Follow-up in 6 months.  Labs before office visits.  3.  Hypothyroidism-continue current treatment.  Follow-up in 12 months.    Information on DASH diet provided.  Blood pressure is a little higher than her baseline.

## 2020-10-28 NOTE — PATIENT INSTRUCTIONS
"DASH Eating Plan  DASH stands for \"Dietary Approaches to Stop Hypertension.\" The DASH eating plan is a healthy eating plan that has been shown to reduce high blood pressure (hypertension). It may also reduce your risk for type 2 diabetes, heart disease, and stroke. The DASH eating plan may also help with weight loss.  What are tips for following this plan?    General guidelines  · Avoid eating more than 2,300 mg (milligrams) of salt (sodium) a day. If you have hypertension, you may need to reduce your sodium intake to 1,500 mg a day.  · Limit alcohol intake to no more than 1 drink a day for nonpregnant women and 2 drinks a day for men. One drink equals 12 oz of beer, 5 oz of wine, or 1½ oz of hard liquor.  · Work with your health care provider to maintain a healthy body weight or to lose weight. Ask what an ideal weight is for you.  · Get at least 30 minutes of exercise that causes your heart to beat faster (aerobic exercise) most days of the week. Activities may include walking, swimming, or biking.  · Work with your health care provider or diet and nutrition specialist (dietitian) to adjust your eating plan to your individual calorie needs.  Reading food labels    · Check food labels for the amount of sodium per serving. Choose foods with less than 5 percent of the Daily Value of sodium. Generally, foods with less than 300 mg of sodium per serving fit into this eating plan.  · To find whole grains, look for the word \"whole\" as the first word in the ingredient list.  Shopping  · Buy products labeled as \"low-sodium\" or \"no salt added.\"  · Buy fresh foods. Avoid canned foods and premade or frozen meals.  Cooking  · Avoid adding salt when cooking. Use salt-free seasonings or herbs instead of table salt or sea salt. Check with your health care provider or pharmacist before using salt substitutes.  · Do not romero foods. Cook foods using healthy methods such as baking, boiling, grilling, and broiling instead.  · Cook with " heart-healthy oils, such as olive, canola, soybean, or sunflower oil.  Meal planning  · Eat a balanced diet that includes:  ? 5 or more servings of fruits and vegetables each day. At each meal, try to fill half of your plate with fruits and vegetables.  ? Up to 6-8 servings of whole grains each day.  ? Less than 6 oz of lean meat, poultry, or fish each day. A 3-oz serving of meat is about the same size as a deck of cards. One egg equals 1 oz.  ? 2 servings of low-fat dairy each day.  ? A serving of nuts, seeds, or beans 5 times each week.  ? Heart-healthy fats. Healthy fats called Omega-3 fatty acids are found in foods such as flaxseeds and coldwater fish, like sardines, salmon, and mackerel.  · Limit how much you eat of the following:  ? Canned or prepackaged foods.  ? Food that is high in trans fat, such as fried foods.  ? Food that is high in saturated fat, such as fatty meat.  ? Sweets, desserts, sugary drinks, and other foods with added sugar.  ? Full-fat dairy products.  · Do not salt foods before eating.  · Try to eat at least 2 vegetarian meals each week.  · Eat more home-cooked food and less restaurant, buffet, and fast food.  · When eating at a restaurant, ask that your food be prepared with less salt or no salt, if possible.  What foods are recommended?  The items listed may not be a complete list. Talk with your dietitian about what dietary choices are best for you.  Grains  Whole-grain or whole-wheat bread. Whole-grain or whole-wheat pasta. Brown rice. Oatmeal. Quinoa. Bulgur. Whole-grain and low-sodium cereals. Evelin bread. Low-fat, low-sodium crackers. Whole-wheat flour tortillas.  Vegetables  Fresh or frozen vegetables (raw, steamed, roasted, or grilled). Low-sodium or reduced-sodium tomato and vegetable juice. Low-sodium or reduced-sodium tomato sauce and tomato paste. Low-sodium or reduced-sodium canned vegetables.  Fruits  All fresh, dried, or frozen fruit. Canned fruit in natural juice (without  added sugar).  Meat and other protein foods  Skinless chicken or turkey. Ground chicken or turkey. Pork with fat trimmed off. Fish and seafood. Egg whites. Dried beans, peas, or lentils. Unsalted nuts, nut butters, and seeds. Unsalted canned beans. Lean cuts of beef with fat trimmed off. Low-sodium, lean deli meat.  Dairy  Low-fat (1%) or fat-free (skim) milk. Fat-free, low-fat, or reduced-fat cheeses. Nonfat, low-sodium ricotta or cottage cheese. Low-fat or nonfat yogurt. Low-fat, low-sodium cheese.  Fats and oils  Soft margarine without trans fats. Vegetable oil. Low-fat, reduced-fat, or light mayonnaise and salad dressings (reduced-sodium). Canola, safflower, olive, soybean, and sunflower oils. Avocado.  Seasoning and other foods  Herbs. Spices. Seasoning mixes without salt. Unsalted popcorn and pretzels. Fat-free sweets.  What foods are not recommended?  The items listed may not be a complete list. Talk with your dietitian about what dietary choices are best for you.  Grains  Baked goods made with fat, such as croissants, muffins, or some breads. Dry pasta or rice meal packs.  Vegetables  Creamed or fried vegetables. Vegetables in a cheese sauce. Regular canned vegetables (not low-sodium or reduced-sodium). Regular canned tomato sauce and paste (not low-sodium or reduced-sodium). Regular tomato and vegetable juice (not low-sodium or reduced-sodium). Pickles. Olives.  Fruits  Canned fruit in a light or heavy syrup. Fried fruit. Fruit in cream or butter sauce.  Meat and other protein foods  Fatty cuts of meat. Ribs. Fried meat. Fabian. Sausage. Bologna and other processed lunch meats. Salami. Fatback. Hotdogs. Bratwurst. Salted nuts and seeds. Canned beans with added salt. Canned or smoked fish. Whole eggs or egg yolks. Chicken or turkey with skin.  Dairy  Whole or 2% milk, cream, and half-and-half. Whole or full-fat cream cheese. Whole-fat or sweetened yogurt. Full-fat cheese. Nondairy creamers. Whipped toppings.  Processed cheese and cheese spreads.  Fats and oils  Butter. Stick margarine. Lard. Shortening. Ghee. Fabian fat. Tropical oils, such as coconut, palm kernel, or palm oil.  Seasoning and other foods  Salted popcorn and pretzels. Onion salt, garlic salt, seasoned salt, table salt, and sea salt. Worcestershire sauce. Tartar sauce. Barbecue sauce. Teriyaki sauce. Soy sauce, including reduced-sodium. Steak sauce. Canned and packaged gravies. Fish sauce. Oyster sauce. Cocktail sauce. Horseradish that you find on the shelf. Ketchup. Mustard. Meat flavorings and tenderizers. Bouillon cubes. Hot sauce and Tabasco sauce. Premade or packaged marinades. Premade or packaged taco seasonings. Relishes. Regular salad dressings.  Where to find more information:  · National Heart, Lung, and Blood [place]: www.nhlbi.nih.gov  · American Heart Association: www.heart.org  Summary  · The DASH eating plan is a healthy eating plan that has been shown to reduce high blood pressure (hypertension). It may also reduce your risk for type 2 diabetes, heart disease, and stroke.  · With the DASH eating plan, you should limit salt (sodium) intake to 2,300 mg a day. If you have hypertension, you may need to reduce your sodium intake to 1,500 mg a day.  · When on the DASH eating plan, aim to eat more fresh fruits and vegetables, whole grains, lean proteins, low-fat dairy, and heart-healthy fats.  · Work with your health care provider or diet and nutrition specialist (dietitian) to adjust your eating plan to your individual calorie needs.  This information is not intended to replace advice given to you by your health care provider. Make sure you discuss any questions you have with your health care provider.  Document Released: 12/06/2012 Document Revised: 11/30/2018 Document Reviewed: 12/11/2017  Elsevier Patient Education © 2020 [employer]vier Inc.

## 2021-02-15 RX ORDER — PANTOPRAZOLE SODIUM 40 MG/1
TABLET, DELAYED RELEASE ORAL
Qty: 90 TABLET | Refills: 0 | Status: SHIPPED | OUTPATIENT
Start: 2021-02-15 | End: 2021-12-08

## 2021-03-22 ENCOUNTER — BULK ORDERING (OUTPATIENT)
Dept: CASE MANAGEMENT | Facility: OTHER | Age: 61
End: 2021-03-22

## 2021-03-22 DIAGNOSIS — Z23 IMMUNIZATION DUE: ICD-10-CM

## 2021-05-07 ENCOUNTER — OFFICE VISIT (OUTPATIENT)
Dept: INTERNAL MEDICINE | Facility: CLINIC | Age: 61
End: 2021-05-07

## 2021-05-07 VITALS
OXYGEN SATURATION: 95 % | DIASTOLIC BLOOD PRESSURE: 80 MMHG | SYSTOLIC BLOOD PRESSURE: 132 MMHG | BODY MASS INDEX: 34.49 KG/M2 | WEIGHT: 207 LBS | HEIGHT: 65 IN | HEART RATE: 79 BPM | TEMPERATURE: 96.9 F

## 2021-05-07 DIAGNOSIS — R73.02 IMPAIRED GLUCOSE TOLERANCE: Primary | ICD-10-CM

## 2021-05-07 DIAGNOSIS — E78.5 HYPERLIPIDEMIA, UNSPECIFIED HYPERLIPIDEMIA TYPE: ICD-10-CM

## 2021-05-07 DIAGNOSIS — E03.9 ACQUIRED HYPOTHYROIDISM: ICD-10-CM

## 2021-05-07 PROCEDURE — 99213 OFFICE O/P EST LOW 20 MIN: CPT | Performed by: FAMILY MEDICINE

## 2021-05-07 NOTE — PROGRESS NOTES
Leo Elliott is a 60 y.o. female.   Chief Complaint   Patient presents with   • Hyperglycemia   • Hyperlipidemia       History of Present Illness     #1 hyperlipidemia-diagnosed years ago.  Patient is on Crestor 10 mg a day. She takes it everyday.  No muscle aches, no muscle cramps.    She does not use tobacco products.  She never did.       #2 impaired fasting glucose-she gained 13 pounds in 6 months.  No family history of diabetes.  Fasting blood sugar 97, A1c at 6.0 from 5.7.  She  works from home.  She eats/snacks more.  She was less physically active in the wintertime.  Lately she started to exercise more.  She walks 3 times a week for an hour.    The following portions of the patient's history were reviewed and updated as appropriate: allergies, current medications, past medical history, past social history and problem list.    Review of Systems   Constitutional: Negative.    Respiratory: Negative for shortness of breath.    Cardiovascular: Negative for chest pain.   Musculoskeletal: Negative for myalgias.   Neurological: Negative for light-headedness.         Objective   Wt Readings from Last 3 Encounters:   05/07/21 93.9 kg (207 lb)   10/28/20 88 kg (194 lb)   04/28/20 86.2 kg (190 lb)      Vitals:    05/07/21 0843   BP: 132/80   Pulse: 79   Temp: 96.9 °F (36.1 °C)   SpO2: 95%     Temp Readings from Last 3 Encounters:   05/07/21 96.9 °F (36.1 °C)   10/28/20 98 °F (36.7 °C)   10/11/19 98.1 °F (36.7 °C)     BP Readings from Last 3 Encounters:   05/07/21 132/80   10/28/20 138/60   10/11/19 110/78     Pulse Readings from Last 3 Encounters:   05/07/21 79   10/28/20 82   11/25/19 69     Body mass index is 34.45 kg/m².    Physical Exam  Constitutional:       Appearance: She is well-developed. She is obese.   HENT:      Head: Normocephalic and atraumatic.   Neck:      Thyroid: No thyromegaly.      Vascular: No carotid bruit.   Cardiovascular:      Rate and Rhythm: Normal rate and regular rhythm.       Heart sounds: Normal heart sounds.   Pulmonary:      Effort: Pulmonary effort is normal.      Breath sounds: Normal breath sounds.   Musculoskeletal:      Cervical back: Neck supple.   Skin:     General: Skin is warm and dry.   Neurological:      Mental Status: She is alert and oriented to person, place, and time.   Psychiatric:         Behavior: Behavior normal.         Assessment/Plan   Diagnoses and all orders for this visit:    1. Impaired glucose tolerance (Primary)  -     Comprehensive Metabolic Panel; Future  -     Hemoglobin A1c; Future    2. Hyperlipidemia, unspecified hyperlipidemia type  -     Comprehensive Metabolic Panel; Future  -     Lipid Panel With LDL / HDL Ratio; Future    3. Acquired hypothyroidism  -     Thyroid Cascade Profile; Future        #1 hyperlipidemia-continue current treatment.  Follow-up in 6 months.  2.  Impaired fasting glucose-increased risk for developing diabetes.  Information on prediabetic diet provided.  Weight loss can decrease risk of developing diabetes.  Patient is advised to try noom and if it does not work to join weight watchers.  Follow-up in 6 months.  Labs before office visit.

## 2021-05-07 NOTE — PATIENT INSTRUCTIONS
Prediabetes Eating Plan  Prediabetes is a condition that causes blood sugar (glucose) levels to be higher than normal. This increases the risk for developing diabetes. In order to prevent diabetes from developing, your health care provider may recommend a diet and other lifestyle changes to help you:  · Control your blood glucose levels.  · Improve your cholesterol levels.  · Manage your blood pressure.  Your health care provider may recommend working with a diet and nutrition specialist (dietitian) to make a meal plan that is best for you.  What are tips for following this plan?  Lifestyle  · Set weight loss goals with the help of your health care team. It is recommended that most people with prediabetes lose 7% of their current body weight.  · Exercise for at least 30 minutes at least 5 days a week.  · Attend a support group or seek ongoing support from a mental health counselor.  · Take over-the-counter and prescription medicines only as told by your health care provider.  Reading food labels  · Read food labels to check the amount of fat, salt (sodium), and sugar in prepackaged foods. Avoid foods that have:  ? Saturated fats.  ? Trans fats.  ? Added sugars.  · Avoid foods that have more than 300 milligrams (mg) of sodium per serving. Limit your daily sodium intake to less than 2,300 mg each day.  Shopping  · Avoid buying pre-made and processed foods.  Cooking  · Cook with olive oil. Do not use butter, lard, or ghee.  · Bake, broil, grill, or boil foods. Avoid frying.  Meal planning    · Work with your dietitian to develop an eating plan that is right for you. This may include:  ? Tracking how many calories you take in. Use a food diary, notebook, or mobile application to track what you eat at each meal.  ? Using the glycemic index (GI) to plan your meals. The index tells you how quickly a food will raise your blood glucose. Choose low-GI foods. These foods take a longer time to raise blood glucose.  · Consider  following a Mediterranean diet. This diet includes:  ? Several servings each day of fresh fruits and vegetables.  ? Eating fish at least twice a week.  ? Several servings each day of whole grains, beans, nuts, and seeds.  ? Using olive oil instead of other fats.  ? Moderate alcohol consumption.  ? Eating small amounts of red meat and whole-fat dairy.  · If you have high blood pressure, you may need to limit your sodium intake or follow a diet such as the DASH eating plan. DASH is an eating plan that aims to lower high blood pressure.  What foods are recommended?  The items listed below may not be a complete list. Talk with your dietitian about what dietary choices are best for you.  Grains  Whole grains, such as whole-wheat or whole-grain breads, crackers, cereals, and pasta. Unsweetened oatmeal. Bulgur. Barley. Quinoa. Brown rice. Corn or whole-wheat flour tortillas or taco shells.  Vegetables  Lettuce. Spinach. Peas. Beets. Cauliflower. Cabbage. Broccoli. Carrots. Tomatoes. Squash. Eggplant. Herbs. Peppers. Onions. Cucumbers. [place] sprouts.  Fruits  Berries. Bananas. Apples. Oranges. Grapes. Papaya. [place]. Pomegranate. Kiwi. Grapefruit. Cherries.  Meats and other protein foods  Seafood. Poultry without skin. Lean cuts of pork and beef. Tofu. Eggs. Nuts. Beans.  Dairy  Low-fat or fat-free dairy products, such as yogurt, cottage cheese, and cheese.  Beverages  Water. Tea. Coffee. Sugar-free or diet soda. [place] water. Lowfat or no-fat milk. Milk alternatives, such as soy or almond milk.  Fats and oils  Olive oil. Canola oil. Sunflower oil. Grapeseed oil. Avocado. Walnuts.  Sweets and desserts  Sugar-free or low-fat pudding. Sugar-free or low-fat ice cream and other frozen treats.  Seasoning and other foods  Herbs. Sodium-free spices. Mustard. Relish. Low-fat, low-sugar ketchup. Low-fat, low-sugar barbecue sauce. Low-fat or fat-free mayonnaise.  What foods are not recommended?  The items listed below may not be a  complete list. Talk with your dietitian about what dietary choices are best for you.  Grains  Refined white flour and flour products, such as bread, pasta, snack foods, and cereals.  Vegetables  Canned vegetables. Frozen vegetables with butter or cream sauce.  Fruits  Fruits canned with syrup.  Meats and other protein foods  Fatty cuts of meat. Poultry with skin. Breaded or fried meat. Processed meats.  Dairy  Full-fat yogurt, cheese, or milk.  Beverages  Sweetened drinks, such as sweet iced tea and soda.  Fats and oils  Butter. Lard. Ghee.  Sweets and desserts  Baked goods, such as cake, cupcakes, pastries, cookies, and cheesecake.  Seasoning and other foods  Spice mixes with added salt. Ketchup. Barbecue sauce. Mayonnaise.  Summary  · To prevent diabetes from developing, you may need to make diet and other lifestyle changes to help control blood sugar, improve cholesterol levels, and manage your blood pressure.  · Set weight loss goals with the help of your health care team. It is recommended that most people with prediabetes lose 7 percent of their current body weight.  · Consider following a Mediterranean diet that includes plenty of fresh fruits and vegetables, whole grains, beans, nuts, seeds, fish, lean meat, low-fat dairy, and healthy oils.  This information is not intended to replace advice given to you by your health care provider. Make sure you discuss any questions you have with your health care provider.  Document Revised: 04/10/2020 Document Reviewed: 02/21/2018  [employer] Patient Education © 2021 Elsevier Inc.

## 2021-05-28 RX ORDER — ROSUVASTATIN CALCIUM 10 MG/1
TABLET, COATED ORAL
Qty: 90 TABLET | Refills: 1 | Status: SHIPPED | OUTPATIENT
Start: 2021-05-28 | End: 2022-01-05

## 2021-07-13 ENCOUNTER — TRANSCRIBE ORDERS (OUTPATIENT)
Dept: ADMINISTRATIVE | Facility: HOSPITAL | Age: 61
End: 2021-07-13

## 2021-07-13 DIAGNOSIS — Z12.31 SCREENING MAMMOGRAM, ENCOUNTER FOR: Primary | ICD-10-CM

## 2021-10-20 ENCOUNTER — HOSPITAL ENCOUNTER (OUTPATIENT)
Dept: MAMMOGRAPHY | Facility: HOSPITAL | Age: 61
Discharge: HOME | End: 2021-10-20
Admitting: FAMILY MEDICINE

## 2021-10-20 DIAGNOSIS — Z12.31 SCREENING MAMMOGRAM, ENCOUNTER FOR: ICD-10-CM

## 2021-10-20 PROCEDURE — 77063 BREAST TOMOSYNTHESIS BI: CPT

## 2021-10-20 PROCEDURE — 77067 SCR MAMMO BI INCL CAD: CPT

## 2021-10-22 ENCOUNTER — TELEPHONE (OUTPATIENT)
Dept: INTERNAL MEDICINE | Facility: CLINIC | Age: 61
End: 2021-10-22

## 2021-10-22 DIAGNOSIS — R92.8 ABNORMAL MAMMOGRAM: Primary | ICD-10-CM

## 2021-10-22 NOTE — TELEPHONE ENCOUNTER
Caller: Bhavna Elliott    Relationship: Self    Best call back number: [phone]*    What is the best time to reach you: ANYTIME    Who are you requesting to speak with (clinical staff, provider,  specific staff member): CLINICAL STAFF MEMBER    What was the call regarding: PATIENT CALLED AND STATED THAT SHE HAD A MAMMOGRAM ON 10/20, AND THE PATIENT RECEIVED THE RESULTS AND WAS ADVISED THAT SHE NEEDS FURTHER EVALUATION.     Do you require a callback: YES, THE PATIENT IS REQUESTING A CALLBACK.

## 2021-12-02 ENCOUNTER — HOSPITAL ENCOUNTER (OUTPATIENT)
Dept: MAMMOGRAPHY | Facility: HOSPITAL | Age: 61
Discharge: HOME | End: 2021-12-02

## 2021-12-02 ENCOUNTER — TELEPHONE (OUTPATIENT)
Dept: INTERNAL MEDICINE | Facility: CLINIC | Age: 61
End: 2021-12-02

## 2021-12-02 ENCOUNTER — HOSPITAL ENCOUNTER (OUTPATIENT)
Dept: ULTRASOUND IMAGING | Facility: HOSPITAL | Age: 61
Discharge: HOME | End: 2021-12-02

## 2021-12-02 DIAGNOSIS — R92.8 ABNORMAL MAMMOGRAM: ICD-10-CM

## 2021-12-02 PROCEDURE — 77065 DX MAMMO INCL CAD UNI: CPT

## 2021-12-02 PROCEDURE — 76642 ULTRASOUND BREAST LIMITED: CPT

## 2021-12-02 PROCEDURE — G0279 TOMOSYNTHESIS, MAMMO: HCPCS

## 2021-12-03 DIAGNOSIS — R92.8 ABNORMAL ULTRASOUND OF BREAST: Primary | ICD-10-CM

## 2021-12-08 ENCOUNTER — HOSPITAL ENCOUNTER (OUTPATIENT)
Dept: MAMMOGRAPHY | Facility: HOSPITAL | Age: 61
Discharge: HOME | End: 2021-12-08
Admitting: RADIOLOGY

## 2021-12-08 VITALS
SYSTOLIC BLOOD PRESSURE: 151 MMHG | DIASTOLIC BLOOD PRESSURE: 77 MMHG | HEIGHT: 65 IN | BODY MASS INDEX: 33.82 KG/M2 | HEART RATE: 63 BPM | RESPIRATION RATE: 18 BRPM | TEMPERATURE: 98.7 F | OXYGEN SATURATION: 100 % | WEIGHT: 203 LBS

## 2021-12-08 DIAGNOSIS — R92.8 ABNORMAL ULTRASOUND OF BREAST: ICD-10-CM

## 2021-12-08 PROCEDURE — 0 LIDOCAINE 1 % SOLUTION: Performed by: FAMILY MEDICINE

## 2021-12-08 PROCEDURE — 88305 TISSUE EXAM BY PATHOLOGIST: CPT | Performed by: FAMILY MEDICINE

## 2021-12-08 RX ORDER — LIDOCAINE HYDROCHLORIDE 10 MG/ML
1 INJECTION, SOLUTION INFILTRATION; PERINEURAL ONCE
Status: COMPLETED | OUTPATIENT
Start: 2021-12-08 | End: 2021-12-08

## 2021-12-08 RX ORDER — DIAZEPAM 5 MG/1
5 TABLET ORAL ONCE AS NEEDED
Status: DISCONTINUED | OUTPATIENT
Start: 2021-12-08 | End: 2021-12-09 | Stop reason: HOSPADM

## 2021-12-08 RX ADMIN — LIDOCAINE HYDROCHLORIDE 20 ML: 10; .005 INJECTION, SOLUTION EPIDURAL; INFILTRATION; INTRACAUDAL; PERINEURAL at 13:04

## 2021-12-08 RX ADMIN — Medication 1 ML: at 13:03

## 2021-12-08 NOTE — H&P
Name: Bhavna Elliott ADMIT: 2021   : 1960  PCP: Suki Darden MD    MRN: [id number] LOS: 0 days   AGE/SEX: 61 y.o. female  ROOM: Room/bed info not found       Chief complaint right breast lesion    Present Illness or Internal History:  Patient is a 61 y.o. female presents with a right breast lesion.     Past Surgical History:  Past Surgical History:   Procedure Laterality Date   • COLONOSCOPY N/A 2017    Procedure: COLONOSCOPY TO CECUM  WITH COLD BIOPSY POLYPECTOMY;  Surgeon: Nidia Saldana MD;  Location: [facility]COPY;  Service:    • COLONOSCOPY W/ POLYPECTOMY N/A 07/10/2012    Hyperplastic sigmoid poylp-Dr. Nidia Saldana   • D & C HYSTEROSCOPY N/A 2019    Dr. Jose Perkins   • HYSTERECTOMY  2019   • WISDOM TOOTH EXTRACTION         Past Medical History:  Past Medical History:   Diagnosis Date   • Colon polyp    • Disease of thyroid gland    • Endometrial adenocarcinoma (HCC)    • Endometrial adenocarcinoma (HCC) 10/26/2019   • GERD (gastroesophageal reflux disease)    • Hyperlipidemia    • Hypothyroidism        Home Medications:  (Not in a hospital admission)      Allergies:  Patient has no known allergies.    Family History:  Family History   Problem Relation Age of Onset   • Hyperlipidemia Mother    • Alzheimer's disease Father    • Cancer Daughter         Sarcoma   • Breast cancer Neg Hx        Social History:  Social History     Tobacco Use   • Smoking status: Never Smoker   • Smokeless tobacco: Never Used   Vaping Use   • Vaping Use: Never used   Substance Use Topics   • Alcohol use: No     Comment: Socially   • Drug use: No        Objective     Physical Exam:    No exam performed today,    Vital Signs  Temp:  [98.7 °F (37.1 °C)] 98.7 °F (37.1 °C)  Heart Rate:  [63] 63  Resp:  [18] 18  BP: (188)/(84) 188/84    Anticipated Surgical Procedure:  tomosynthesis guided vacuum assisted right breast biopsy with clip placement    The risks, benefits and  alternatives of this procedure have been discussed with the patient or responsible party: Yes        Venkat Valdez Jr., MD  12/08/21  12:47 EST

## 2021-12-08 NOTE — NURSING NOTE
Biopsy site to right outer breast clear with Dermabond dry and intact. No firmness or swelling noted at or around biopsy site. Denies pain. Ice pack with protective covering applied to biopsy site. Discharge instructions discussed with understanding voiced by patient. Copies provided to patient. No distress noted. To home via private vehicle accompanied by her daughter.

## 2021-12-09 LAB
LAB AP CASE REPORT: NORMAL
LAB AP DIAGNOSIS COMMENT: NORMAL
PATH REPORT.FINAL DX SPEC: NORMAL
PATH REPORT.GROSS SPEC: NORMAL

## 2021-12-10 ENCOUNTER — OFFICE VISIT (OUTPATIENT)
Dept: INTERNAL MEDICINE | Facility: CLINIC | Age: 61
End: 2021-12-10

## 2021-12-10 VITALS
HEART RATE: 70 BPM | DIASTOLIC BLOOD PRESSURE: 80 MMHG | OXYGEN SATURATION: 99 % | TEMPERATURE: 97.7 F | WEIGHT: 202.1 LBS | SYSTOLIC BLOOD PRESSURE: 140 MMHG | BODY MASS INDEX: 33.67 KG/M2 | HEIGHT: 65 IN

## 2021-12-10 DIAGNOSIS — R73.02 IMPAIRED GLUCOSE TOLERANCE: ICD-10-CM

## 2021-12-10 DIAGNOSIS — E78.5 HYPERLIPIDEMIA, UNSPECIFIED HYPERLIPIDEMIA TYPE: Primary | ICD-10-CM

## 2021-12-10 DIAGNOSIS — N64.4 BREAST PAIN: ICD-10-CM

## 2021-12-10 DIAGNOSIS — E03.9 ACQUIRED HYPOTHYROIDISM: ICD-10-CM

## 2021-12-10 PROBLEM — Z85.42 HISTORY OF ENDOMETRIAL CANCER: Status: ACTIVE | Noted: 2020-08-06

## 2021-12-10 PROCEDURE — 99214 OFFICE O/P EST MOD 30 MIN: CPT | Performed by: FAMILY MEDICINE

## 2021-12-10 NOTE — PROGRESS NOTES
Leo Elliott is a 61 y.o. female.   Chief Complaint   Patient presents with   • Hyperlipidemia   • IGF   • Breast Pain     Pt c/o bilateral armpit and breast pain X 10 days. Pt had neg breast Biopsy today.       History of Present Illness     #1 hyperlipidemia-diagnosed years ago.  Patient is on Crestor 10 mg a day. She takes it everyday.  No muscle aches, no muscle cramps.  , HDL 56, LFTs normal  She does not use tobacco products.  She never did.       #2 impaired fasting glucose-weight is down by 5 pounds from May.  No family history of diabetes.  Fasting blood sugar 99, A1c at  6.2 from 6.0 from 5.7.      #3 hypothyroidism-patient has no history of thyroid surgery.  She is on levothyroxine at 112 µg a day.  She takes it everyday on empty stomach and does not eat for 30 minutes after taking it.  TSH is at 0.162, free T4 one 3.54.  She is on no biotin.    #4 breast pain-patient had screening mammogram on 10/20/2021.  She had a diagnostic breast mammogram on 12/2.  It shows suspicious 0.9 cm right breast mass.  She had a biopsy 2 days ago.  It came back negative for cancer.  She is scheduled for office visit with Dr. Mims.    The following portions of the patient's history were reviewed and updated as appropriate: allergies, current medications, past family history, past medical history, past social history, past surgical history and problem list.    Review of Systems   Constitutional: Negative.    Respiratory: Negative.    Cardiovascular: Negative.          Objective   Wt Readings from Last 3 Encounters:   12/10/21 91.7 kg (202 lb 1.6 oz)   12/08/21 92.1 kg (203 lb)   05/07/21 93.9 kg (207 lb)      Vitals:    12/10/21 0922   BP: 140/80   Pulse: 70   Temp: 97.7 °F (36.5 °C)   SpO2: 99%     Temp Readings from Last 3 Encounters:   12/10/21 97.7 °F (36.5 °C)   12/08/21 98.7 °F (37.1 °C) (Temporal)   05/07/21 96.9 °F (36.1 °C)     BP Readings from Last 3 Encounters:   12/10/21 140/80    12/08/21 151/77   05/07/21 132/80     Pulse Readings from Last 3 Encounters:   12/10/21 70   12/08/21 63   05/07/21 79     Body mass index is 33.63 kg/m².    Physical Exam  Constitutional:       Appearance: She is well-developed. She is obese.   HENT:      Head: Normocephalic and atraumatic.   Neck:      Thyroid: No thyromegaly.      Vascular: No carotid bruit.   Cardiovascular:      Rate and Rhythm: Normal rate and regular rhythm.      Heart sounds: Normal heart sounds.   Pulmonary:      Effort: Pulmonary effort is normal.      Breath sounds: Normal breath sounds.   Musculoskeletal:      Cervical back: Neck supple.   Skin:     General: Skin is warm and dry.   Neurological:      Mental Status: She is alert and oriented to person, place, and time.   Psychiatric:         Behavior: Behavior normal.         Assessment/Plan   Diagnoses and all orders for this visit:    1. Hyperlipidemia, unspecified hyperlipidemia type (Primary)    2. Impaired glucose tolerance  -     Hemoglobin A1c; Future  -     Basic Metabolic Panel; Future    3. Breast pain    4. Acquired hypothyroidism  -     Thyroid Cascade Profile; Future        #1 hyperlipidemia-continue current treatment.  Follow-up in 6 months.  2.  Impaired fasting glucose-information on prediabetic diet and counting carbs provided.  Follow-up in 3 months.  3.  Hypothyroidism-abnormal TSH.  We are decreasing dose of levothyroxine 112, she will take 1 tablet 6 days a week, half tablet on Sundays.  Labs in 3 months prior to office visit.  4.  Breast pain-advised to decrease amount of caffeine.  She will follow up with Dr. Mims as scheduled.

## 2021-12-14 ENCOUNTER — TELEPHONE (OUTPATIENT)
Dept: SURGERY | Facility: CLINIC | Age: 61
End: 2021-12-14

## 2021-12-14 NOTE — TELEPHONE ENCOUNTER
New patient appointment with Arianne Dobson NP is scheduled on 5/16/2022 @ 10:30am.    Called and spoke to patient, patient expressed v/u of appointment time.    Sent patient a reminder letter in the mail.

## 2022-01-05 RX ORDER — ROSUVASTATIN CALCIUM 10 MG/1
TABLET, COATED ORAL
Qty: 90 TABLET | Refills: 1 | Status: SHIPPED | OUTPATIENT
Start: 2022-01-05 | End: 2022-07-08

## 2022-01-10 RX ORDER — LEVOTHYROXINE SODIUM 112 UG/1
TABLET ORAL
Qty: 90 TABLET | Refills: 0 | Status: SHIPPED | OUTPATIENT
Start: 2022-01-10 | End: 2022-04-04

## 2022-02-01 ENCOUNTER — TELEPHONE (OUTPATIENT)
Dept: SURGERY | Facility: CLINIC | Age: 62
End: 2022-02-01

## 2022-03-01 ENCOUNTER — TELEPHONE (OUTPATIENT)
Dept: SURGERY | Facility: CLINIC | Age: 62
End: 2022-03-01

## 2022-03-01 ENCOUNTER — OFFICE VISIT (OUTPATIENT)
Dept: SURGERY | Facility: CLINIC | Age: 62
End: 2022-03-01

## 2022-03-01 VITALS
SYSTOLIC BLOOD PRESSURE: 151 MMHG | BODY MASS INDEX: 33.66 KG/M2 | HEIGHT: 65 IN | HEART RATE: 77 BPM | WEIGHT: 202 LBS | DIASTOLIC BLOOD PRESSURE: 81 MMHG

## 2022-03-01 DIAGNOSIS — N64.4 MASTODYNIA: ICD-10-CM

## 2022-03-01 DIAGNOSIS — N60.12 FIBROCYSTIC BREAST CHANGES, BILATERAL: ICD-10-CM

## 2022-03-01 DIAGNOSIS — R92.8 ABNORMAL FINDING ON BREAST IMAGING: Primary | ICD-10-CM

## 2022-03-01 DIAGNOSIS — N60.11 FIBROCYSTIC BREAST CHANGES, BILATERAL: ICD-10-CM

## 2022-03-01 PROCEDURE — 99214 OFFICE O/P EST MOD 30 MIN: CPT | Performed by: NURSE PRACTITIONER

## 2022-03-01 NOTE — PROGRESS NOTES
[facility]     Referring Provider: Suki Darden MD     Chief complaint: abnormal breast imaging     HPI: Ms. Bhavna Elliott is a 60 yo woman, seen at the request of Suki Darden MD, for abnormal breast imaging and right breast pain    I personally reviewed her records and summarized her relevant breast history/imaging:    She has a personal history of recent benign right breast biopsy in 12/21.         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 (T1aN0i+M0) treated in 2019.     12/10/2021:  Clinic visit with Suki Darden MD  breast pain-patient had screening mammogram on 10/20/2021.  She had a diagnostic breast mammogram on 12/2.  It shows suspicious 0.9 cm right breast mass.  She had a biopsy 2 days ago.  It came back negative for cancer.  She is scheduled for office visit with Dr. Mims.  Breast pain-advised to decrease amount of caffeine.  She will follow up with Dr. Mims as scheduled    8/10/2020: Bilateral screening mammogram with tomosynthesis at [facility]  FINDINGS: Bilateral digital CC and MLO mammographic and digital Tomosynthesis images were obtained. Comparison is made to prior studies dated 3/4/2019 and 2/9/2017 .   Scattered fibroglandular densities are seen throughout both breasts in a pattern which is unchanged. I see no new or dominant masses, areas of architectural distortion or skin thickening. There is no evidence for axillary lymphadenopathy or nipple retraction.   IMPRESSION:  1. There is no evidence for malignancy or significant change in either breast. Routine followup mammography is recommended.  BI-RADS category 1: Negative.    10/20/2021: Screening mammogram with tomosynthesis at [facility]  FINDINGS: Bilateral digital CC and MLO mammographic and Tomosynthesis images were obtained. Comparison is made to prior examinations  dated 08/10/2020, 03/04/2019 and 02/09/2017.   The parenchyma of both breasts is largely fatty-replaced. Within the posterior one third lateral aspect of the right breast projecting at the level of the nipple there is an area of focal asymmetry. I see no suspicious calcifications or areas of  architectural distortion in either breast. There is no evidence for skin thickening, nipple retraction or axillary adenopathy.   IMPRESSION:  1. There is an area of focal asymmetry in the posterior one third lateral aspect of the right breast. Further evaluation with spot compression CC and MLO mammographic and Tomosynthesis images and targeted right breast sonography is recommended.  2. There are no findings suspicious for malignancy in the left breast.   BI-RADS Category 0: Incomplete. Needs additional imaging evaluation.    12/2/2021: Right diagnostic mammogram with tomosynthesis, right breast limited ultrasound at [facility]  There are scattered areas of fibroglandular density.    There is a persistent 0.9 cm mass with obscured margins in the upper outer posterior right breast. This area was further assessed with ultrasound.    ULTRASOUND:  Targeted sonographic evaluation of the right breast was performed from 10:00 to 11:00 in the region of the mammographic mass. At 10:00, 4 cm from the nipple, there is a 0.8 x 0.2 x 0.4 cm oval hypoechoic mass with an adjacent blood vessel, which is probably benign and felt to reflect a lymph node. This is likely incidental to the mass on mammogram as no  dominant blood vessel is identified adjacent to the mass on mammogram.  At 7:00, 2 cm from the nipple, there is an incidental 0.2 x 0.2 x 0.4 cm benign-appearing cyst. No definite sonographic correlate is identified for the mammographic mass.   IMPRESSION:  1.  Suspicious 0.9 cm mass in the upper outer posterior right breast without a sonographic correlate. Recommend further evaluation with stereotactic/Tomosynthesis  guided core needle biopsy.  2.  A 0.8 cm mass at 10:00 in the right breast is probably benign and favored to reflect an intramammary lymph node. If the above pathology results are benign, short-term follow-up targeted right breast ultrasound would be recommended in 3 months to document short-term stability.   Findings and recommendations were discussed with the patient in person at the time of her examination. An Epic in basket message was sent to Dr. Darden on 12/2/2021.  BI-RADS Category 4: Suspicious    12/8/2021: Right breast stereotactic biopsy at [facility]  PROCEDURE NOTE: Informed consent was obtained. Preliminary Tomosynthesis images of the right breast were obtained. The overlying skin was prepped in the usual sterile fashion. Local anesthesia was achieved with 1%  lidocaine. A nick was made in the skin with a scalpel. Through the nick was inserted an 8 gauge Mammotome vacuum assisted device. Repeat stereotactic/Tomosynthesis images were obtained demonstrating adequate placement of the biopsy needle. Multiple tissue specimens were obtained.  A specimen radiograph was obtained demonstrating a focal area of increased density within the specimen. A bowtie-shaped metallic clip was placed to james the site. Pressure was applied until bleeding subsided and the overlying skin was cleaned and bandaged. Postbiopsy mammography of the right breast demonstrates placement of a bowtie-shaped metallic clip at the 9 o'clock position.     The pathology result has returned as clustered apocrine cysts with florid usual ductal hyperplasia and adenosis with microcalcifications. This is concordant with imaging findings.   IMPRESSION:  Technically successful Tomosynthesis guided Mammotome vacuum assisted right breast biopsy with placement of a metallic clip. The pathology result has returned as benign. Routine clinical and imaging follow-up is appropriate.    12/8/2021: pathology right breast biopsy  1. Right  Breast, 9 o'clock, Stereotactic Biopsies for a Mass:  A. Clustered apocrine cysts, florid usual ductal hyperplasia and adenosis with associated microcalcifications.  B. No atypical hyperplasia, in situ, nor invasive carcinoma identified      Today she presents with only mild right breast discomfort. Shortly after the mammogram in 10/21, she had fairly severe right breast discomfort, a sharp pain, deep in her chest near the right nipple.  The pain stopped completely after the biopsy in 12/21 but has been recurring to a lesser degree recently.    She denies any breast lumps, skin changes, or nipple discharge.    She was by herself in clinic today.       Review of Systems   HENT:   Positive for tinnitus.    Endocrine: Positive for hot flashes.   All other systems reviewed and are negative.      Medications:    Current Outpatient Medications:   •  levothyroxine (SYNTHROID, LEVOTHROID) 112 MCG tablet, TAKE 1 TABLET BY MOUTH EVERY DAY, Disp: 90 tablet, Rfl: 0  •  rosuvastatin (CRESTOR) 10 MG tablet, TAKE 1 TABLET BY MOUTH EVERY DAY, Disp: 90 tablet, Rfl: 1    Allergies:  No Known Allergies    Medical history:  Past Medical History:   Diagnosis Date   • Colon polyp    • Disease of thyroid gland    • Endometrial adenocarcinoma (HCC)    • Endometrial adenocarcinoma (HCC) 10/26/2019   • GERD (gastroesophageal reflux disease)    • Hyperlipidemia    • Hypothyroidism        Surgical History:  Past Surgical History:   Procedure Laterality Date   • BREAST BIOPSY Right 2022    Benign   • COLONOSCOPY N/A 11/20/2017    Procedure: COLONOSCOPY TO CECUM  WITH COLD BIOPSY POLYPECTOMY;  Surgeon: Nidia Saldana MD;  Location: [facility];  Service:    • COLONOSCOPY W/ POLYPECTOMY N/A 07/10/2012    Hyperplastic sigmoid poylp-Dr. Nidia Saldana   • D & C HYSTEROSCOPY N/A 11/13/2019    Dr. Jose Perkins   • HYSTERECTOMY  11/20/2019   • WISDOM TOOTH EXTRACTION         Family History:  Family History   Problem Relation Age of Onset   •  "Hyperlipidemia Mother    • Alzheimer's disease Father    • Prostate cancer Father    • Cancer Daughter         Sarcoma   • Breast cancer Neg Hx        Social History:   Social History     Socioeconomic History   • Marital status:      Spouse name: Oscar   • Number of children: 2   • Years of education: College   Tobacco Use   • Smoking status: Never Smoker   • Smokeless tobacco: Never Used   Vaping Use   • Vaping Use: Never used   Substance and Sexual Activity   • Alcohol use: No     Comment: Socially   • Drug use: No   • Sexual activity: Defer     Patient drinks 2 servings of caffeine per day.       GYNECOLOGIC HISTORY:   . P: 2. AB: 2.  Last menstrual period: age 54  Age at menarche: 15  Age at first childbirth: 29  Lactation/How lon months  Age at menopause: 54  Total years of oral contraceptive use: 20  Total years of hormone replacement therapy: n/a      Physical Exam  Vitals:    22 1417   BP: 151/81   Pulse: 77        /81 (BP Location: Right arm)   Pulse 77   Ht 165.1 cm (65\")   Wt 91.6 kg (202 lb)   BMI 33.61 kg/m²     ECOG 0 - Asymptomatic  General: NAD, well appearing  Psych: a&o x 3, normal mood and affect  Eyes: EOMI, no scleral icterus  ENMT: neck supple without masses or thyromegaly, mucus membranes moist  Resp: normal effort, CTAB  CV: RRR, no murmurs, no edema   GI: soft, NT, ND  MSK: normal gait, normal ROM in bilateral shoulders  Lymph nodes:  no cervical, supraclavicular or axillary lymphadenopathy  Breast: symmetric, mild ptosis  Right:  No visible abnormalities on inspection while seated, with arms raised or hands on hips. No masses, skin changes, or nipple abnormalities.  No tenderness reported with exam today.  Left:  No visible abnormalities on inspection while seated, with arms raised or hands on hips. No masses, skin changes, or nipple abnormalities.      Assessment:    1)  61 y.o. F with biopsy proven benign right breast calcifications (2021), routine " follow up recommended    2)  Abnormal sonographic imaging right breast for which short term follow up is recommended (12/2021)  A 0.8 cm mass at 10:00 in the right breast is probably benign and favored to reflect an intramammary lymph node.    3)  Benign breast changes    4)  Mild right breast mastodynia    Discussion:  1)  Imaging and pathology results were reviewed.  Pathology results are benign and concordant, routine follow up is recommended with screening mammogram.    2)  Imaging results from 12/2021 were reviewed, follow up right limited US is recommended for 0.8 cm mass at 10:00 in the right breast is probably benign and favored to reflect an intramammary lymph node.  A right limited US will be ordered in the near future as recommended at [facility].  She is in agreement with this plan.    3)  We discussed fibrocystic change and how this is benign and can sometimes be associated with increased breast density. I reassured her today that she had a normal clinical breast exam and recent normal bilateral imaging. I explained that cords of dense breast tissue or focal areas of fibrocystic change can sometimes feel like a lump on exam. This is common in women like her with heterogeneous and nodular tissue. I encouraged her to become familiar with her own self-exam over the next few months to establish a personal baseline.    4) We discussed breast pain and how it can sometimes be difficult to treat. We discussed that this is often related to hormonal changes. Caffeine and nicotine can also play a role in breast pain, and I encouraged her to continue only moderate caffeine consumption and continue avoiding nicotine. We also discussed the importance of wearing a good supportive bra. She can try wearing a sports bra during the day. She also can try sleeping in a sports bra or tight camisole. We discussed additional therapies such as vitamin E supplementation and that this may or may not be useful. We also discussed using  OTC tylenol or ibuprofen and/or topical products for prn pain control.     Her pain is mild and infrequent currently, she will continue to monitor.    Plan:  1. Wear sports bras during the day when possible. Wear sports bra or tight camisole at night while sleeping.   2. Take Vitamin E, 200 U, twice daily.   3. Continue to reduce caffeine intake.   4. May use OTC tylenol or ibuprofen, or topical products for pain control.       - right limited US in the near future at [facility], I will call her with the results and follow up recommendations.  - exam in 3 months      I have advised the patient to continue monthly breast self examination and to return to see me in 3 months.  She was also advised to notify us if she develops new breast symptoms.       Arianne Dobson, ROBLES           CC:  No ref. provider found  Suki Darden MD EMR Dragon/transcription disclaimer:  Dictated using Dragon dictation

## 2022-03-01 NOTE — TELEPHONE ENCOUNTER
[facility] 3/2/22 @ 11:30.   Patient is aware.

## 2022-03-02 ENCOUNTER — TELEPHONE (OUTPATIENT)
Dept: SURGERY | Facility: CLINIC | Age: 62
End: 2022-03-02

## 2022-03-02 ENCOUNTER — PATIENT ROUNDING (BHMG ONLY) (OUTPATIENT)
Dept: SURGERY | Facility: CLINIC | Age: 62
End: 2022-03-02

## 2022-03-02 ENCOUNTER — HOSPITAL ENCOUNTER (OUTPATIENT)
Dept: ULTRASOUND IMAGING | Facility: HOSPITAL | Age: 62
Discharge: HOME | End: 2022-03-02
Admitting: NURSE PRACTITIONER

## 2022-03-02 DIAGNOSIS — R92.8 ABNORMAL FINDING ON BREAST IMAGING: ICD-10-CM

## 2022-03-02 PROCEDURE — 76642 ULTRASOUND BREAST LIMITED: CPT

## 2022-03-02 NOTE — TELEPHONE ENCOUNTER
Right limited Us results reported to patient.  Stable findings with short term follow up with mammogram in 10/22.  She will follow up as scheduled in 3 months with exam.    IMPRESSION:  No significant change in a probably benign right breast mass dating back  to 12/2/2021. Recommend follow-up targeted right breast ultrasound at  the time of the patient's annual bilateral diagnostic mammogram in  October 2022.     BI-RADS Category 3: Probably benign

## 2022-03-02 NOTE — PROGRESS NOTES
March 2, 2022    Hello, may I speak with Bhavna Elliott?    My name is Heather.     I am  with [facility]  [address] [facility] [address] [phone].    Before we get started may I verify your date of birth? 1960    I am calling to officially welcome you to our practice and ask about your recent visit. Is this a good time to talk? Yes.    Tell me about your visit with us. What things went well? Listened well. Very knowledgeable. Seen and scheduled promptly.       We're always looking for ways to make our patients' experiences even better. Do you have recommendations on ways we may improve?  No.    Overall were you satisfied with your first visit to our practice? Yes.       I appreciate you taking the time to speak with me today. Is there anything else I can do for you? No.      Thank you, and have a great day.

## 2022-03-11 ENCOUNTER — OFFICE VISIT (OUTPATIENT)
Dept: INTERNAL MEDICINE | Facility: CLINIC | Age: 62
End: 2022-03-11

## 2022-03-11 VITALS
HEIGHT: 65 IN | HEART RATE: 67 BPM | BODY MASS INDEX: 33.49 KG/M2 | OXYGEN SATURATION: 98 % | WEIGHT: 201 LBS | DIASTOLIC BLOOD PRESSURE: 70 MMHG | SYSTOLIC BLOOD PRESSURE: 132 MMHG | TEMPERATURE: 98 F

## 2022-03-11 DIAGNOSIS — E03.9 ACQUIRED HYPOTHYROIDISM: ICD-10-CM

## 2022-03-11 DIAGNOSIS — E78.5 HYPERLIPIDEMIA, UNSPECIFIED HYPERLIPIDEMIA TYPE: ICD-10-CM

## 2022-03-11 DIAGNOSIS — R73.02 IMPAIRED GLUCOSE TOLERANCE: Primary | ICD-10-CM

## 2022-03-11 PROCEDURE — 99213 OFFICE O/P EST LOW 20 MIN: CPT | Performed by: FAMILY MEDICINE

## 2022-03-11 NOTE — PROGRESS NOTES
Leo Elliott is a 61 y.o. female.   Chief Complaint   Patient presents with   • Hypothyroidism   • Hyperlipidemia       History of Present Illness      #1impaired fasting glucose-weight is down by 2 pounds from last office visit.  She improved her diet.  She eats less carbs, more salads.  Grilled chicken, roasted vegetables.  No regular exercise yet.  No family history of diabetes.  Fasting blood sugar 114 from 99, A1c at   6.1 from 6.2 from 6.0 from 5.7.       #2 hypothyroidism-patient has no history of thyroid surgery.  She is on levothyroxine at 112 µg a day.  She takes 1 tablet 6 days a week, half tablet on Sundays.  We adjusted dose at last office visit.  She takes it everyday on empty stomach and does not eat for 30 minutes after taking it.  TSH is at 2.2 from 0.162.       The following portions of the patient's history were reviewed and updated as appropriate: allergies, current medications, past family history, past medical history, past social history, past surgical history and problem list.    Review of Systems   Constitutional: Negative.    Respiratory: Negative.    Cardiovascular: Negative.          Objective   Wt Readings from Last 3 Encounters:   03/11/22 91.2 kg (201 lb)   03/01/22 91.6 kg (202 lb)   12/10/21 91.7 kg (202 lb 1.6 oz)      Vitals:    03/11/22 1117   BP: 140/84   Pulse: 67   Temp: 98 °F (36.7 °C)   SpO2: 98%     Temp Readings from Last 3 Encounters:   03/11/22 98 °F (36.7 °C)   12/10/21 97.7 °F (36.5 °C)   12/08/21 98.7 °F (37.1 °C) (Temporal)     BP Readings from Last 3 Encounters:   03/11/22 140/84   03/01/22 151/81   12/10/21 140/80     Pulse Readings from Last 3 Encounters:   03/11/22 67   03/01/22 77   12/10/21 70     Body mass index is 33.45 kg/m².    Physical Exam  Constitutional:       Appearance: She is well-developed. She is obese.   HENT:      Head: Normocephalic and atraumatic.   Neck:      Vascular: No carotid bruit.   Cardiovascular:      Rate and Rhythm:  Normal rate and regular rhythm.      Heart sounds: Normal heart sounds.   Pulmonary:      Effort: Pulmonary effort is normal.      Breath sounds: Normal breath sounds.   Musculoskeletal:      Cervical back: Neck supple.   Skin:     General: Skin is warm and dry.   Neurological:      Mental Status: She is alert and oriented to person, place, and time.   Psychiatric:         Behavior: Behavior normal.         Assessment/Plan   Diagnoses and all orders for this visit:    1. Impaired glucose tolerance (Primary)  -     Basic Metabolic Panel; Future  -     Hemoglobin A1c; Future    2. Hyperlipidemia, unspecified hyperlipidemia type  -     CBC (No Diff); Future    3. Acquired hypothyroidism  -     CBC (No Diff); Future        1.  Impaired fasting glucose-better, she will continue to work on low carbs, smaller portion size and will start to exercise.  Labs in 3 months before office visit.  2.  Hypothyroidism-continue current treatment.  Follow-up in 12 months.

## 2022-04-04 RX ORDER — LEVOTHYROXINE SODIUM 112 UG/1
TABLET ORAL
Qty: 90 TABLET | Refills: 1 | Status: SHIPPED | OUTPATIENT
Start: 2022-04-04 | End: 2022-10-03

## 2022-06-08 ENCOUNTER — TELEPHONE (OUTPATIENT)
Dept: SURGERY | Facility: CLINIC | Age: 62
End: 2022-06-08

## 2022-06-08 DIAGNOSIS — R92.8 ABNORMAL FINDING ON BREAST IMAGING: Primary | ICD-10-CM

## 2022-06-08 NOTE — TELEPHONE ENCOUNTER
Erwin Stover. [place] [facility] 10/5/22 @ 9:30.   Patient is aware.

## 2022-06-24 ENCOUNTER — OFFICE VISIT (OUTPATIENT)
Dept: INTERNAL MEDICINE | Facility: CLINIC | Age: 62
End: 2022-06-24

## 2022-06-24 VITALS
HEIGHT: 65 IN | TEMPERATURE: 98.7 F | HEART RATE: 100 BPM | DIASTOLIC BLOOD PRESSURE: 80 MMHG | BODY MASS INDEX: 33.82 KG/M2 | WEIGHT: 203 LBS | SYSTOLIC BLOOD PRESSURE: 130 MMHG | OXYGEN SATURATION: 97 %

## 2022-06-24 DIAGNOSIS — E78.5 HYPERLIPIDEMIA, UNSPECIFIED HYPERLIPIDEMIA TYPE: Primary | ICD-10-CM

## 2022-06-24 DIAGNOSIS — E03.9 ACQUIRED HYPOTHYROIDISM: ICD-10-CM

## 2022-06-24 DIAGNOSIS — R73.02 IMPAIRED GLUCOSE TOLERANCE: ICD-10-CM

## 2022-06-24 PROCEDURE — 99214 OFFICE O/P EST MOD 30 MIN: CPT | Performed by: FAMILY MEDICINE

## 2022-06-24 NOTE — PROGRESS NOTES
Leo Elliott is a 61 y.o. female.   Chief Complaint   Patient presents with   • Hyperlipidemia   • Hyperglycemia       History of Present Illness     #1 hyperlipidemia-diagnosed years ago.  Patient is on Crestor 10 mg a day. She takes it everyday.  No muscle aches, no muscle cramps.  No regular exercise.  She does not use tobacco products.  She never did.       #2 impaired fasting glucose-weight is stable.  No family history of diabetes.  Fasting blood sugar 114, A1c at 6.2 from 6.1.    The following portions of the patient's history were reviewed and updated as appropriate: allergies, current medications, past family history, past medical history, past social history, past surgical history and problem list.    Review of Systems   Respiratory: Negative for shortness of breath.    Cardiovascular: Negative for chest pain and palpitations.   Musculoskeletal: Negative for myalgias.   Neurological: Negative for light-headedness.         Objective   Wt Readings from Last 3 Encounters:   06/24/22 92.1 kg (203 lb)   03/11/22 91.2 kg (201 lb)   03/01/22 91.6 kg (202 lb)      Vitals:    06/24/22 0828   BP: 130/80   Pulse: 100   Temp: 98.7 °F (37.1 °C)   SpO2: 97%     Temp Readings from Last 3 Encounters:   06/24/22 98.7 °F (37.1 °C)   03/11/22 98 °F (36.7 °C)   12/10/21 97.7 °F (36.5 °C)     BP Readings from Last 3 Encounters:   06/24/22 130/80   03/11/22 132/70   03/01/22 151/81     Pulse Readings from Last 3 Encounters:   06/24/22 100   03/11/22 67   03/01/22 77     Body mass index is 33.78 kg/m².    Physical Exam  Constitutional:       Appearance: She is well-developed.   HENT:      Head: Normocephalic and atraumatic.   Neck:      Thyroid: No thyromegaly.      Vascular: No carotid bruit.   Cardiovascular:      Rate and Rhythm: Normal rate and regular rhythm.      Heart sounds: Normal heart sounds.   Pulmonary:      Effort: Pulmonary effort is normal.      Breath sounds: Normal breath sounds.    Musculoskeletal:      Cervical back: Neck supple.   Skin:     General: Skin is warm and dry.   Neurological:      Mental Status: She is alert and oriented to person, place, and time.   Psychiatric:         Behavior: Behavior normal.         Assessment & Plan   Diagnoses and all orders for this visit:    1. Hyperlipidemia, unspecified hyperlipidemia type (Primary)  -     Lipid Panel With LDL / HDL Ratio; Future    2. Impaired glucose tolerance  -     Hemoglobin A1c; Future  -     Lipid Panel With LDL / HDL Ratio; Future  -     Comprehensive Metabolic Panel; Future    3. Acquired hypothyroidism  -     Thyroid Cascade Profile; Future        1.  Hyperlipidemia-continue current treatment.  Follow-up in 6 months.  2.  Impaired fasting glucose - a little worse.  Information on prediabetic diet provided.  Exercise encouraged.  Follow-up in 6 months.

## 2022-07-08 RX ORDER — ROSUVASTATIN CALCIUM 10 MG/1
TABLET, COATED ORAL
Qty: 90 TABLET | Refills: 1 | Status: SHIPPED | OUTPATIENT
Start: 2022-07-08 | End: 2023-01-13 | Stop reason: SDUPTHER

## 2022-07-25 ENCOUNTER — HOSPITAL ENCOUNTER (EMERGENCY)
Facility: HOSPITAL | Age: 62
Discharge: HOME | End: 2022-07-25
Attending: EMERGENCY MEDICINE | Admitting: EMERGENCY MEDICINE

## 2022-07-25 VITALS
SYSTOLIC BLOOD PRESSURE: 170 MMHG | DIASTOLIC BLOOD PRESSURE: 84 MMHG | RESPIRATION RATE: 18 BRPM | TEMPERATURE: 97.1 F | HEART RATE: 53 BPM | OXYGEN SATURATION: 100 %

## 2022-07-25 DIAGNOSIS — R53.1 GENERALIZED WEAKNESS: Primary | ICD-10-CM

## 2022-07-25 DIAGNOSIS — T67.5XXA HEAT EXHAUSTION, INITIAL ENCOUNTER: ICD-10-CM

## 2022-07-25 LAB
ALBUMIN SERPL-MCNC: 4.5 G/DL (ref 3.5–5.2)
ALBUMIN/GLOB SERPL: 1.4 G/DL
ALP SERPL-CCNC: 44 U/L (ref 39–117)
ALT SERPL W P-5'-P-CCNC: 27 U/L (ref 1–33)
ANION GAP SERPL CALCULATED.3IONS-SCNC: 10.9 MMOL/L (ref 5–15)
AST SERPL-CCNC: 29 U/L (ref 1–32)
BACTERIA UR QL AUTO: ABNORMAL /HPF
BASOPHILS # BLD AUTO: 0.03 10*3/MM3 (ref 0–0.2)
BASOPHILS NFR BLD AUTO: 0.7 % (ref 0–1.5)
BILIRUB SERPL-MCNC: 0.7 MG/DL (ref 0–1.2)
BILIRUB UR QL STRIP: NEGATIVE
BUN SERPL-MCNC: 11 MG/DL (ref 8–23)
BUN/CREAT SERPL: 12.6 (ref 7–25)
CALCIUM SPEC-SCNC: 9.6 MG/DL (ref 8.6–10.5)
CHLORIDE SERPL-SCNC: 106 MMOL/L (ref 98–107)
CK SERPL-CCNC: 200 U/L (ref 20–180)
CLARITY UR: ABNORMAL
CO2 SERPL-SCNC: 26.1 MMOL/L (ref 22–29)
COLOR UR: YELLOW
CREAT SERPL-MCNC: 0.87 MG/DL (ref 0.57–1)
DEPRECATED RDW RBC AUTO: 42.9 FL (ref 37–54)
EGFRCR SERPLBLD CKD-EPI 2021: 75.4 ML/MIN/1.73
EOSINOPHIL # BLD AUTO: 0.19 10*3/MM3 (ref 0–0.4)
EOSINOPHIL NFR BLD AUTO: 4.1 % (ref 0.3–6.2)
ERYTHROCYTE [DISTWIDTH] IN BLOOD BY AUTOMATED COUNT: 12.6 % (ref 12.3–15.4)
GLOBULIN UR ELPH-MCNC: 3.2 GM/DL
GLUCOSE SERPL-MCNC: 115 MG/DL (ref 65–99)
GLUCOSE UR STRIP-MCNC: NEGATIVE MG/DL
HCT VFR BLD AUTO: 38.3 % (ref 34–46.6)
HGB BLD-MCNC: 13.2 G/DL (ref 12–15.9)
HGB UR QL STRIP.AUTO: NEGATIVE
HYALINE CASTS UR QL AUTO: ABNORMAL /LPF
IMM GRANULOCYTES # BLD AUTO: 0.01 10*3/MM3 (ref 0–0.05)
IMM GRANULOCYTES NFR BLD AUTO: 0.2 % (ref 0–0.5)
KETONES UR QL STRIP: NEGATIVE
LEUKOCYTE ESTERASE UR QL STRIP.AUTO: ABNORMAL
LYMPHOCYTES # BLD AUTO: 1.84 10*3/MM3 (ref 0.7–3.1)
LYMPHOCYTES NFR BLD AUTO: 39.9 % (ref 19.6–45.3)
MCH RBC QN AUTO: 32.5 PG (ref 26.6–33)
MCHC RBC AUTO-ENTMCNC: 34.5 G/DL (ref 31.5–35.7)
MCV RBC AUTO: 94.3 FL (ref 79–97)
MONOCYTES # BLD AUTO: 0.43 10*3/MM3 (ref 0.1–0.9)
MONOCYTES NFR BLD AUTO: 9.3 % (ref 5–12)
NEUTROPHILS NFR BLD AUTO: 2.11 10*3/MM3 (ref 1.7–7)
NEUTROPHILS NFR BLD AUTO: 45.8 % (ref 42.7–76)
NITRITE UR QL STRIP: NEGATIVE
NRBC BLD AUTO-RTO: 0 /100 WBC (ref 0–0.2)
PH UR STRIP.AUTO: 6.5 [PH] (ref 5–8)
PLATELET # BLD AUTO: 168 10*3/MM3 (ref 140–450)
PMV BLD AUTO: 10.3 FL (ref 6–12)
POTASSIUM SERPL-SCNC: 4.7 MMOL/L (ref 3.5–5.2)
PROT SERPL-MCNC: 7.7 G/DL (ref 6–8.5)
PROT UR QL STRIP: NEGATIVE
QT INTERVAL: 459 MS
RBC # BLD AUTO: 4.06 10*6/MM3 (ref 3.77–5.28)
RBC # UR STRIP: ABNORMAL /HPF
REF LAB TEST METHOD: ABNORMAL
SODIUM SERPL-SCNC: 143 MMOL/L (ref 136–145)
SP GR UR STRIP: 1.01 (ref 1–1.03)
SQUAMOUS #/AREA URNS HPF: ABNORMAL /HPF
TROPONIN T SERPL-MCNC: <0.01 NG/ML (ref 0–0.03)
UROBILINOGEN UR QL STRIP: ABNORMAL
WBC # UR STRIP: ABNORMAL /HPF
WBC NRBC COR # BLD: 4.61 10*3/MM3 (ref 3.4–10.8)

## 2022-07-25 PROCEDURE — 93010 ELECTROCARDIOGRAM REPORT: CPT | Performed by: INTERNAL MEDICINE

## 2022-07-25 PROCEDURE — 93005 ELECTROCARDIOGRAM TRACING: CPT | Performed by: EMERGENCY MEDICINE

## 2022-07-25 PROCEDURE — 81001 URINALYSIS AUTO W/SCOPE: CPT | Performed by: EMERGENCY MEDICINE

## 2022-07-25 PROCEDURE — 85025 COMPLETE CBC W/AUTO DIFF WBC: CPT | Performed by: EMERGENCY MEDICINE

## 2022-07-25 PROCEDURE — 84484 ASSAY OF TROPONIN QUANT: CPT | Performed by: EMERGENCY MEDICINE

## 2022-07-25 PROCEDURE — 82550 ASSAY OF CK (CPK): CPT | Performed by: EMERGENCY MEDICINE

## 2022-07-25 PROCEDURE — 80053 COMPREHEN METABOLIC PANEL: CPT | Performed by: EMERGENCY MEDICINE

## 2022-07-25 PROCEDURE — 99283 EMERGENCY DEPT VISIT LOW MDM: CPT

## 2022-07-25 RX ORDER — SODIUM CHLORIDE 0.9 % (FLUSH) 0.9 %
10 SYRINGE (ML) INJECTION AS NEEDED
Status: DISCONTINUED | OUTPATIENT
Start: 2022-07-25 | End: 2022-07-25 | Stop reason: HOSPADM

## 2022-07-25 RX ADMIN — SODIUM CHLORIDE 1000 ML: 9 INJECTION, SOLUTION INTRAVENOUS at 01:52

## 2022-07-25 NOTE — ED PROVIDER NOTES
EMERGENCY DEPARTMENT ENCOUNTER    CHIEF COMPLAINT  Chief Complaint: Generalized weakness  History given by: Patient  History limited by: None  Room Number: 16/16  PMD: Suki Darden MD      HPI:  Pt is a 61 y.o. female who presents complaining of generalized weakness with associated lightheadedness and nausea that occurred after being out in the heat all day today.  She reports that symptoms started this afternoon and have not resolved, though slightly improved since cooling.  She denies any associated chest pain, shortness of breath, nausea/vomiting, or fever/chills.    Duration: Ongoing since this afternoon  Onset: Gradual  Location: Generalized  Radiation: None  Quality: Generalized weakness  Intensity/Severity: Moderate  Progression: Slightly improved  Associated Symptoms: Lightheadedness, nausea  Aggravating Factors: Extreme heat  Alleviating Factors: Beginning to have improve with cooling  Previous Episodes: No  Treatment before arrival: None    PAST MEDICAL HISTORY  Active Ambulatory Problems     Diagnosis Date Noted   • Hyperlipidemia 04/08/2016   • Impaired glucose tolerance 04/08/2016   • Hypothyroidism 04/08/2016   • Health care maintenance 04/08/2016   • Colon cancer screening 09/08/2017   • History of colon polyps 09/08/2017   • Dysphagia 11/22/2019   • Endometrial adenocarcinoma (HCC) 10/26/2019   • GERD (gastroesophageal reflux disease) 10/27/2019   • History of endometrial cancer 08/06/2020   • Abnormal finding on breast imaging 03/01/2022   • Fibrocystic breast changes, bilateral 03/01/2022   • Mastodynia 03/01/2022     Resolved Ambulatory Problems     Diagnosis Date Noted   • Colon polyp 01/18/2017   • Generalized abdominal pain 05/02/2018     Past Medical History:   Diagnosis Date   • Disease of thyroid gland        PAST SURGICAL HISTORY  Past Surgical History:   Procedure Laterality Date   • BREAST BIOPSY Right 2022    Benign   • COLONOSCOPY N/A 11/20/2017    Procedure: COLONOSCOPY TO CECUM   WITH COLD BIOPSY POLYPECTOMY;  Surgeon: Nidia Saldana MD;  Location: [facility] ENDOSCOPY;  Service:    • COLONOSCOPY W/ POLYPECTOMY N/A 07/10/2012    Hyperplastic sigmoid poylp-Dr. Nidia Saldana   • D & C HYSTEROSCOPY N/A 11/13/2019    Dr. Jose Perkins   • HYSTERECTOMY  11/20/2019   • WISDOM TOOTH EXTRACTION         FAMILY HISTORY  Family History   Problem Relation Age of Onset   • Hyperlipidemia Mother    • Alzheimer's disease Father    • Prostate cancer Father    • Cancer Daughter         Sarcoma   • Breast cancer Neg Hx        SOCIAL HISTORY  Social History     Socioeconomic History   • Marital status:      Spouse name: Oscar   • Number of children: 2   • Years of education: College   Tobacco Use   • Smoking status: Never Smoker   • Smokeless tobacco: Never Used   Vaping Use   • Vaping Use: Never used   Substance and Sexual Activity   • Alcohol use: No     Comment: Socially   • Drug use: No   • Sexual activity: Defer       ALLERGIES  Patient has no known allergies.    REVIEW OF SYSTEMS  Review of Systems   Constitutional: Negative for fever.   HENT: Negative for sore throat.    Eyes: Negative.    Respiratory: Negative for cough and shortness of breath.    Cardiovascular: Negative for chest pain.   Gastrointestinal: Positive for nausea. Negative for abdominal pain, diarrhea and vomiting.   Genitourinary: Negative for dysuria.   Musculoskeletal: Negative for neck pain.   Skin: Negative for rash.   Allergic/Immunologic: Negative.    Neurological: Positive for weakness and light-headedness. Negative for numbness and headaches.   Hematological: Negative.    Psychiatric/Behavioral: Negative.    All other systems reviewed and are negative.      PHYSICAL EXAM  ED Triage Vitals [07/25/22 0020]   Temp Heart Rate Resp BP SpO2   97.1 °F (36.2 °C) 58 18 (!) 190/96 96 %      Temp src Heart Rate Source Patient Position BP Location FiO2 (%)   -- -- -- -- --       Physical Exam  Vitals and nursing note reviewed.    Constitutional:       General: She is not in acute distress.  HENT:      Head: Normocephalic and atraumatic.   Eyes:      Pupils: Pupils are equal, round, and reactive to light.   Cardiovascular:      Rate and Rhythm: Regular rhythm. Bradycardia present.      Heart sounds: Normal heart sounds.   Pulmonary:      Effort: Pulmonary effort is normal. No respiratory distress.      Breath sounds: Normal breath sounds.   Abdominal:      Palpations: Abdomen is soft.      Tenderness: There is no abdominal tenderness. There is no guarding or rebound.   Musculoskeletal:         General: Normal range of motion.      Cervical back: Normal range of motion and neck supple.   Skin:     General: Skin is warm and dry.      Findings: No rash.   Neurological:      Mental Status: She is alert and oriented to person, place, and time.      Sensory: Sensation is intact.   Psychiatric:         Mood and Affect: Mood and affect normal.         LAB RESULTS  Lab Results (last 24 hours)     Procedure Component Value Units Date/Time    CBC & Differential [[id number]]  (Normal) Collected: 07/25/22 0152    Specimen: Blood Updated: 07/25/22 0201    Narrative:      The following orders were created for panel order CBC & Differential.  Procedure                               Abnormality         Status                     ---------                               -----------         ------                     CBC Auto Differential[[id number]]        Normal              Final result                 Please view results for these tests on the individual orders.    Comprehensive Metabolic Panel [[id number]]  (Abnormal) Collected: 07/25/22 0152    Specimen: Blood Updated: 07/25/22 0222     Glucose 115 mg/dL      BUN 11 mg/dL      Creatinine 0.87 mg/dL      Sodium 143 mmol/L      Potassium 4.7 mmol/L      Comment: Specimen hemolyzed.  Results may be affected.        Chloride 106 mmol/L      CO2 26.1 mmol/L      Calcium 9.6 mg/dL      Total Protein 7.7 g/dL       Albumin 4.50 g/dL      ALT (SGPT) 27 U/L      Comment: Specimen hemolyzed.  Results may be affected.        AST (SGOT) 29 U/L      Comment: Specimen hemolyzed.  Results may be affected.        Alkaline Phosphatase 44 U/L      Total Bilirubin 0.7 mg/dL      Globulin 3.2 gm/dL      A/G Ratio 1.4 g/dL      BUN/Creatinine Ratio 12.6     Anion Gap 10.9 mmol/L      eGFR 75.4 mL/min/1.73      Comment: National Kidney Foundation and American Society of Nephrology (ASN) Task Force recommended calculation based on the Chronic Kidney Disease Epidemiology Collaboration (CKD-EPI) equation refit without adjustment for race.       Narrative:      GFR Normal >60  Chronic Kidney Disease <60  Kidney Failure <15      Urinalysis With Microscopic If Indicated (No Culture) - Urine, Clean Catch [[id number]]  (Abnormal) Collected: 07/25/22 0152    Specimen: Urine, Clean Catch Updated: 07/25/22 0204     Color, UA Yellow     Appearance, UA Cloudy     pH, UA 6.5     Specific Gravity, UA 1.015     Glucose, UA Negative     Ketones, UA Negative     Bilirubin, UA Negative     Blood, UA Negative     Protein, UA Negative     Leuk Esterase, UA Trace     Nitrite, UA Negative     Urobilinogen, UA 0.2 E.U./dL    Troponin [[id number]]  (Normal) Collected: 07/25/22 0152    Specimen: Blood Updated: 07/25/22 0221     Troponin T <0.010 ng/mL     Narrative:      Troponin T Reference Range:  <= 0.03 ng/mL-   Negative for AMI  >0.03 ng/mL-     Abnormal for myocardial necrosis.  Clinicians would have to utilize clinical acumen, EKG, Troponin and serial changes to determine if it is an Acute Myocardial Infarction or myocardial injury due to an underlying chronic condition.       Results may be falsely decreased if patient taking Biotin.      CK [[id number]]  (Abnormal) Collected: 07/25/22 0152    Specimen: Blood Updated: 07/25/22 0222     Creatine Kinase 200 U/L      Comment: Specimen hemolyzed.  Results may be affected.       CBC Auto Differential [[id number]]   (Normal) Collected: 07/25/22 0152    Specimen: Blood Updated: 07/25/22 0201     WBC 4.61 10*3/mm3      RBC 4.06 10*6/mm3      Hemoglobin 13.2 g/dL      Hematocrit 38.3 %      MCV 94.3 fL      MCH 32.5 pg      MCHC 34.5 g/dL      RDW 12.6 %      RDW-SD 42.9 fl      MPV 10.3 fL      Platelets 168 10*3/mm3      Neutrophil % 45.8 %      Lymphocyte % 39.9 %      Monocyte % 9.3 %      Eosinophil % 4.1 %      Basophil % 0.7 %      Immature Grans % 0.2 %      Neutrophils, Absolute 2.11 10*3/mm3      Lymphocytes, Absolute 1.84 10*3/mm3      Monocytes, Absolute 0.43 10*3/mm3      Eosinophils, Absolute 0.19 10*3/mm3      Basophils, Absolute 0.03 10*3/mm3      Immature Grans, Absolute 0.01 10*3/mm3      nRBC 0.0 /100 WBC     Urinalysis, Microscopic Only - Urine, Clean Catch [[id number]]  (Abnormal) Collected: 07/25/22 0152    Specimen: Urine, Clean Catch Updated: 07/25/22 0204     RBC, UA 0-2 /HPF      WBC, UA 3-5 /HPF      Bacteria, UA None Seen /HPF      Squamous Epithelial Cells, UA 0-2 /HPF      Hyaline Casts, UA None Seen /LPF      Methodology Automated Microscopy          I ordered the above labs and reviewed the results    RADIOLOGY  No orders to display        I ordered the above noted radiological studies. Interpreted by radiologist.  Reviewed by me in PACS.       PROCEDURES  Procedures  EKG          EKG time: 0131  Rhythm/Rate: sinus bradycardia, 52  P waves and CA: nml  QRS, axis: nml, nml   ST and T waves: nml     Interpreted Contemporaneously by me, independently viewed  No previous EKG available for comparison      PROGRESS AND CONSULTS     The patient was wearing a facemask upon entrance into the room and remained in such throughout their visit.  I was wearing PPE including a facemask, eye protection, as well as gloves at any point entering the room and throughout the visit    0255  On reevaluation, the patient reports that she is feeling quite a bit better and her vital signs have significantly improved with  fluid administration.  It appears as though she has approximately 200 to 300 cc of normal saline left in her bolus.  I did inform her that she is slightly dehydrated however her labs otherwise are unremarkable.  She should rest today and drink plenty of fluids.  But at this point, the patient will be stable for discharge after the fluids have finished.  The patient is in agreement with today's plan and all questions been answered.    MEDICAL DECISION MAKING  Results were reviewed/discussed with the patient and they were also made aware of online access. Pt also made aware that some labs, such as cultures, will not be resulted during ER visit and follow up with PMD is necessary.     MDM  Number of Diagnoses or Management Options     Amount and/or Complexity of Data Reviewed  Clinical lab tests: reviewed and ordered  Tests in the medicine section of CPT®: reviewed and ordered  Review and summarize past medical records: yes (There are no previous emergency room records available for review)           DIAGNOSIS  Final diagnoses:   Generalized weakness   Heat exhaustion, initial encounter       DISPOSITION  DISCHARGE    Patient discharged in stable condition.    Reviewed implications of results, diagnosis, meds, responsibility to follow up, warning signs and symptoms of possible worsening, potential complications and reasons to return to ER.    Patient/Family voiced understanding of above instructions.    Discussed plan for discharge, as there is no emergent indication for admission. Patient referred to primary care provider for BP management due to today's BP. Pt/family is agreeable and understands need for follow up and repeat testing.  Pt is aware that discharge does not mean that nothing is wrong but it indicates no emergency is present that requires admission and they must continue care with follow-up as given below or physician of their choice.     FOLLOW-UP  Suki Darden MD  [address]  [address]  [address] [place] [id number]  [phone]    Schedule an appointment as soon as possible for a visit            Medication List      No changes were made to your prescriptions during this visit.           Latest Documented Vital Signs:  As of 06:36 EDT  BP- 170/84 HR- 53 Temp- 97.1 °F (36.2 °C) O2 sat- 100%         Juan Richards MD  07/25/22 0608

## 2022-07-25 NOTE — ED NOTES
"[place] and is experiencing weakness and nausea. \"Exhaustion after exertion.\"   "

## 2022-08-14 ENCOUNTER — HOSPITAL ENCOUNTER (EMERGENCY)
Facility: HOSPITAL | Age: 62
Discharge: HOME | End: 2022-08-15
Attending: EMERGENCY MEDICINE | Admitting: INTERNAL MEDICINE

## 2022-08-14 ENCOUNTER — APPOINTMENT (OUTPATIENT)
Dept: GENERAL RADIOLOGY | Facility: HOSPITAL | Age: 62
End: 2022-08-14

## 2022-08-14 DIAGNOSIS — K22.2 ESOPHAGEAL STRICTURE: ICD-10-CM

## 2022-08-14 DIAGNOSIS — T18.128A ESOPHAGEAL OBSTRUCTION DUE TO FOOD IMPACTION: Primary | ICD-10-CM

## 2022-08-14 DIAGNOSIS — T18.128S FOOD IMPACTION OF ESOPHAGUS, SEQUELA: ICD-10-CM

## 2022-08-14 DIAGNOSIS — R13.10 DYSPHAGIA, UNSPECIFIED TYPE: ICD-10-CM

## 2022-08-14 DIAGNOSIS — K22.2 ESOPHAGEAL OBSTRUCTION DUE TO FOOD IMPACTION: Primary | ICD-10-CM

## 2022-08-14 DIAGNOSIS — K21.00 GASTROESOPHAGEAL REFLUX DISEASE WITH ESOPHAGITIS WITHOUT HEMORRHAGE: ICD-10-CM

## 2022-08-14 LAB
ANION GAP SERPL CALCULATED.3IONS-SCNC: 13.8 MMOL/L (ref 5–15)
BASOPHILS # BLD AUTO: 0.03 10*3/MM3 (ref 0–0.2)
BASOPHILS NFR BLD AUTO: 0.5 % (ref 0–1.5)
BUN SERPL-MCNC: 13 MG/DL (ref 8–23)
BUN/CREAT SERPL: 16.9 (ref 7–25)
CALCIUM SPEC-SCNC: 9.4 MG/DL (ref 8.6–10.5)
CHLORIDE SERPL-SCNC: 106 MMOL/L (ref 98–107)
CO2 SERPL-SCNC: 23.2 MMOL/L (ref 22–29)
CREAT SERPL-MCNC: 0.77 MG/DL (ref 0.57–1)
DEPRECATED RDW RBC AUTO: 42.3 FL (ref 37–54)
EGFRCR SERPLBLD CKD-EPI 2021: 87.3 ML/MIN/1.73
EOSINOPHIL # BLD AUTO: 0.17 10*3/MM3 (ref 0–0.4)
EOSINOPHIL NFR BLD AUTO: 3 % (ref 0.3–6.2)
ERYTHROCYTE [DISTWIDTH] IN BLOOD BY AUTOMATED COUNT: 12.5 % (ref 12.3–15.4)
GLUCOSE SERPL-MCNC: 107 MG/DL (ref 65–99)
HCT VFR BLD AUTO: 39.2 % (ref 34–46.6)
HGB BLD-MCNC: 13.6 G/DL (ref 12–15.9)
IMM GRANULOCYTES # BLD AUTO: 0.01 10*3/MM3 (ref 0–0.05)
IMM GRANULOCYTES NFR BLD AUTO: 0.2 % (ref 0–0.5)
LYMPHOCYTES # BLD AUTO: 1.66 10*3/MM3 (ref 0.7–3.1)
LYMPHOCYTES NFR BLD AUTO: 29 % (ref 19.6–45.3)
MCH RBC QN AUTO: 32.3 PG (ref 26.6–33)
MCHC RBC AUTO-ENTMCNC: 34.7 G/DL (ref 31.5–35.7)
MCV RBC AUTO: 93.1 FL (ref 79–97)
MONOCYTES # BLD AUTO: 0.45 10*3/MM3 (ref 0.1–0.9)
MONOCYTES NFR BLD AUTO: 7.9 % (ref 5–12)
NEUTROPHILS NFR BLD AUTO: 3.41 10*3/MM3 (ref 1.7–7)
NEUTROPHILS NFR BLD AUTO: 59.4 % (ref 42.7–76)
NRBC BLD AUTO-RTO: 0 /100 WBC (ref 0–0.2)
PLATELET # BLD AUTO: 164 10*3/MM3 (ref 140–450)
PMV BLD AUTO: 10.2 FL (ref 6–12)
POTASSIUM SERPL-SCNC: 3.9 MMOL/L (ref 3.5–5.2)
RBC # BLD AUTO: 4.21 10*6/MM3 (ref 3.77–5.28)
SARS-COV-2 RNA RESP QL NAA+PROBE: NOT DETECTED
SODIUM SERPL-SCNC: 143 MMOL/L (ref 136–145)
WBC NRBC COR # BLD: 5.73 10*3/MM3 (ref 3.4–10.8)

## 2022-08-14 PROCEDURE — 85025 COMPLETE CBC W/AUTO DIFF WBC: CPT | Performed by: EMERGENCY MEDICINE

## 2022-08-14 PROCEDURE — 96374 THER/PROPH/DIAG INJ IV PUSH: CPT

## 2022-08-14 PROCEDURE — 96375 TX/PRO/DX INJ NEW DRUG ADDON: CPT

## 2022-08-14 PROCEDURE — 80048 BASIC METABOLIC PNL TOTAL CA: CPT | Performed by: EMERGENCY MEDICINE

## 2022-08-14 PROCEDURE — 25010000002 ONDANSETRON PER 1 MG: Performed by: EMERGENCY MEDICINE

## 2022-08-14 PROCEDURE — 25010000002 GLUCAGON (HUMAN RECOMBINANT) 1 MG RECONSTITUTED SOLUTION: Performed by: EMERGENCY MEDICINE

## 2022-08-14 PROCEDURE — 71045 X-RAY EXAM CHEST 1 VIEW: CPT

## 2022-08-14 PROCEDURE — U0003 INFECTIOUS AGENT DETECTION BY NUCLEIC ACID (DNA OR RNA); SEVERE ACUTE RESPIRATORY SYNDROME CORONAVIRUS 2 (SARS-COV-2) (CORONAVIRUS DISEASE [COVID-19]), AMPLIFIED PROBE TECHNIQUE, MAKING USE OF HIGH THROUGHPUT TECHNOLOGIES AS DESCRIBED BY CMS-2020-01-R: HCPCS | Performed by: EMERGENCY MEDICINE

## 2022-08-14 PROCEDURE — 99284 EMERGENCY DEPT VISIT MOD MDM: CPT

## 2022-08-14 PROCEDURE — C9803 HOPD COVID-19 SPEC COLLECT: HCPCS

## 2022-08-14 RX ORDER — ONDANSETRON 2 MG/ML
4 INJECTION INTRAMUSCULAR; INTRAVENOUS ONCE
Status: COMPLETED | OUTPATIENT
Start: 2022-08-14 | End: 2022-08-14

## 2022-08-14 RX ORDER — SODIUM CHLORIDE 0.9 % (FLUSH) 0.9 %
10 SYRINGE (ML) INJECTION AS NEEDED
Status: DISCONTINUED | OUTPATIENT
Start: 2022-08-14 | End: 2022-08-15 | Stop reason: HOSPADM

## 2022-08-14 RX ADMIN — ONDANSETRON 4 MG: 2 INJECTION INTRAMUSCULAR; INTRAVENOUS at 22:14

## 2022-08-14 RX ADMIN — SODIUM CHLORIDE, POTASSIUM CHLORIDE, SODIUM LACTATE AND CALCIUM CHLORIDE 1000 ML: 600; 310; 30; 20 INJECTION, SOLUTION INTRAVENOUS at 22:11

## 2022-08-14 RX ADMIN — GLUCAGON HYDROCHLORIDE 0.5 MG: KIT at 22:14

## 2022-08-15 ENCOUNTER — ANESTHESIA EVENT (OUTPATIENT)
Dept: PERIOP | Facility: HOSPITAL | Age: 62
End: 2022-08-15

## 2022-08-15 ENCOUNTER — ANESTHESIA (OUTPATIENT)
Dept: PERIOP | Facility: HOSPITAL | Age: 62
End: 2022-08-15

## 2022-08-15 VITALS
HEIGHT: 64 IN | TEMPERATURE: 97.8 F | SYSTOLIC BLOOD PRESSURE: 141 MMHG | RESPIRATION RATE: 16 BRPM | WEIGHT: 203.04 LBS | OXYGEN SATURATION: 97 % | HEART RATE: 64 BPM | BODY MASS INDEX: 34.66 KG/M2 | DIASTOLIC BLOOD PRESSURE: 82 MMHG

## 2022-08-15 PROCEDURE — C1726 CATH, BAL DIL, NON-VASCULAR: HCPCS | Performed by: INTERNAL MEDICINE

## 2022-08-15 PROCEDURE — 88305 TISSUE EXAM BY PATHOLOGIST: CPT | Performed by: INTERNAL MEDICINE

## 2022-08-15 PROCEDURE — 25010000002 PROPOFOL 1000 MG/100ML EMULSION: Performed by: ANESTHESIOLOGY

## 2022-08-15 PROCEDURE — 25010000002 SUCCINYLCHOLINE PER 20 MG: Performed by: ANESTHESIOLOGY

## 2022-08-15 PROCEDURE — 25010000002 ONDANSETRON PER 1 MG: Performed by: NURSE ANESTHETIST, CERTIFIED REGISTERED

## 2022-08-15 PROCEDURE — 43247 EGD REMOVE FOREIGN BODY: CPT | Performed by: INTERNAL MEDICINE

## 2022-08-15 PROCEDURE — 99284 EMERGENCY DEPT VISIT MOD MDM: CPT | Performed by: INTERNAL MEDICINE

## 2022-08-15 PROCEDURE — 43249 ESOPH EGD DILATION <30 MM: CPT | Performed by: INTERNAL MEDICINE

## 2022-08-15 PROCEDURE — 43239 EGD BIOPSY SINGLE/MULTIPLE: CPT | Performed by: INTERNAL MEDICINE

## 2022-08-15 RX ORDER — LIDOCAINE HYDROCHLORIDE 10 MG/ML
0.5 INJECTION, SOLUTION EPIDURAL; INFILTRATION; INTRACAUDAL; PERINEURAL ONCE AS NEEDED
Status: DISCONTINUED | OUTPATIENT
Start: 2022-08-15 | End: 2022-08-15 | Stop reason: HOSPADM

## 2022-08-15 RX ORDER — DIPHENHYDRAMINE HCL 25 MG
25 CAPSULE ORAL
Status: DISCONTINUED | OUTPATIENT
Start: 2022-08-15 | End: 2022-08-15 | Stop reason: HOSPADM

## 2022-08-15 RX ORDER — PROMETHAZINE HYDROCHLORIDE 25 MG/1
25 SUPPOSITORY RECTAL ONCE AS NEEDED
Status: DISCONTINUED | OUTPATIENT
Start: 2022-08-15 | End: 2022-08-15 | Stop reason: HOSPADM

## 2022-08-15 RX ORDER — NALOXONE HCL 0.4 MG/ML
0.2 VIAL (ML) INJECTION AS NEEDED
Status: DISCONTINUED | OUTPATIENT
Start: 2022-08-15 | End: 2022-08-15 | Stop reason: HOSPADM

## 2022-08-15 RX ORDER — FENTANYL CITRATE 50 UG/ML
50 INJECTION, SOLUTION INTRAMUSCULAR; INTRAVENOUS
Status: DISCONTINUED | OUTPATIENT
Start: 2022-08-15 | End: 2022-08-15 | Stop reason: HOSPADM

## 2022-08-15 RX ORDER — SUCCINYLCHOLINE CHLORIDE 20 MG/ML
INJECTION INTRAMUSCULAR; INTRAVENOUS AS NEEDED
Status: DISCONTINUED | OUTPATIENT
Start: 2022-08-15 | End: 2022-08-15 | Stop reason: SURG

## 2022-08-15 RX ORDER — ONDANSETRON 2 MG/ML
INJECTION INTRAMUSCULAR; INTRAVENOUS AS NEEDED
Status: DISCONTINUED | OUTPATIENT
Start: 2022-08-15 | End: 2022-08-15 | Stop reason: SURG

## 2022-08-15 RX ORDER — SODIUM CHLORIDE 0.9 % (FLUSH) 0.9 %
10 SYRINGE (ML) INJECTION AS NEEDED
Status: DISCONTINUED | OUTPATIENT
Start: 2022-08-15 | End: 2022-08-15 | Stop reason: HOSPADM

## 2022-08-15 RX ORDER — SODIUM CHLORIDE, SODIUM LACTATE, POTASSIUM CHLORIDE, CALCIUM CHLORIDE 600; 310; 30; 20 MG/100ML; MG/100ML; MG/100ML; MG/100ML
9 INJECTION, SOLUTION INTRAVENOUS CONTINUOUS PRN
Status: DISCONTINUED | OUTPATIENT
Start: 2022-08-15 | End: 2022-08-15 | Stop reason: HOSPADM

## 2022-08-15 RX ORDER — HYDRALAZINE HYDROCHLORIDE 20 MG/ML
5 INJECTION INTRAMUSCULAR; INTRAVENOUS
Status: DISCONTINUED | OUTPATIENT
Start: 2022-08-15 | End: 2022-08-15 | Stop reason: HOSPADM

## 2022-08-15 RX ORDER — PROMETHAZINE HYDROCHLORIDE 25 MG/1
25 TABLET ORAL ONCE AS NEEDED
Status: DISCONTINUED | OUTPATIENT
Start: 2022-08-15 | End: 2022-08-15 | Stop reason: HOSPADM

## 2022-08-15 RX ORDER — ONDANSETRON 2 MG/ML
4 INJECTION INTRAMUSCULAR; INTRAVENOUS ONCE AS NEEDED
Status: DISCONTINUED | OUTPATIENT
Start: 2022-08-15 | End: 2022-08-15 | Stop reason: HOSPADM

## 2022-08-15 RX ORDER — LIDOCAINE HYDROCHLORIDE 20 MG/ML
INJECTION, SOLUTION INFILTRATION; PERINEURAL AS NEEDED
Status: DISCONTINUED | OUTPATIENT
Start: 2022-08-15 | End: 2022-08-15 | Stop reason: SURG

## 2022-08-15 RX ORDER — HYDROMORPHONE HYDROCHLORIDE 1 MG/ML
0.5 INJECTION, SOLUTION INTRAMUSCULAR; INTRAVENOUS; SUBCUTANEOUS
Status: DISCONTINUED | OUTPATIENT
Start: 2022-08-15 | End: 2022-08-15 | Stop reason: HOSPADM

## 2022-08-15 RX ORDER — PROPOFOL 10 MG/ML
INJECTION, EMULSION INTRAVENOUS AS NEEDED
Status: DISCONTINUED | OUTPATIENT
Start: 2022-08-15 | End: 2022-08-15 | Stop reason: SURG

## 2022-08-15 RX ORDER — LABETALOL HYDROCHLORIDE 5 MG/ML
5 INJECTION, SOLUTION INTRAVENOUS
Status: DISCONTINUED | OUTPATIENT
Start: 2022-08-15 | End: 2022-08-15 | Stop reason: HOSPADM

## 2022-08-15 RX ORDER — EPHEDRINE SULFATE 50 MG/ML
5 INJECTION, SOLUTION INTRAVENOUS ONCE AS NEEDED
Status: DISCONTINUED | OUTPATIENT
Start: 2022-08-15 | End: 2022-08-15 | Stop reason: HOSPADM

## 2022-08-15 RX ORDER — DIPHENHYDRAMINE HYDROCHLORIDE 50 MG/ML
12.5 INJECTION INTRAMUSCULAR; INTRAVENOUS
Status: DISCONTINUED | OUTPATIENT
Start: 2022-08-15 | End: 2022-08-15 | Stop reason: HOSPADM

## 2022-08-15 RX ORDER — SODIUM CHLORIDE 0.9 % (FLUSH) 0.9 %
10 SYRINGE (ML) INJECTION EVERY 12 HOURS SCHEDULED
Status: DISCONTINUED | OUTPATIENT
Start: 2022-08-15 | End: 2022-08-15 | Stop reason: HOSPADM

## 2022-08-15 RX ORDER — FLUMAZENIL 0.1 MG/ML
0.2 INJECTION INTRAVENOUS AS NEEDED
Status: DISCONTINUED | OUTPATIENT
Start: 2022-08-15 | End: 2022-08-15 | Stop reason: HOSPADM

## 2022-08-15 RX ADMIN — SUCCINYLCHOLINE CHLORIDE 100 MG: 20 INJECTION, SOLUTION INTRAMUSCULAR; INTRAVENOUS; PARENTERAL at 01:20

## 2022-08-15 RX ADMIN — ONDANSETRON 4 MG: 2 INJECTION INTRAMUSCULAR; INTRAVENOUS at 01:38

## 2022-08-15 RX ADMIN — LIDOCAINE HYDROCHLORIDE 60 MG: 20 INJECTION, SOLUTION INFILTRATION; PERINEURAL at 01:19

## 2022-08-15 RX ADMIN — PROPOFOL 200 MG: 10 INJECTION, EMULSION INTRAVENOUS at 01:20

## 2022-08-15 RX ADMIN — SODIUM CHLORIDE, POTASSIUM CHLORIDE, SODIUM LACTATE AND CALCIUM CHLORIDE 9 ML/HR: 600; 310; 30; 20 INJECTION, SOLUTION INTRAVENOUS at 01:09

## 2022-08-15 RX ADMIN — Medication 10 ML: at 01:09

## 2022-08-15 NOTE — CONSULTS
[facility]  Initial Inpatient Consult Note    Referring Provider: Dr. DONALDO Das    Reason for Consultation: Esophageal foreign body.    Subjective     History of present illness:    61 y.o. female who states that she ate a pork chop about 5:30 PM feels that something is stuck in the epigastric region.  She cannot swallow her spit.  She usually takes Tums for heartburn.  A similar episode happened about 10 years ago when chicken got stuck.  She had an esophageal foreign body removal at [facility] at that time.  She has no chest pain or abdominal pain.  No diarrhea or constipation.  No rectal bleeding or melena.  She last had a colonoscopy by Dr. Zechariah Saldana about 5 years ago and a few polyps were removed.  She states she is due now for another colonoscopy.    Past Medical History:  Past Medical History:   Diagnosis Date   • Colon polyp    • Disease of thyroid gland    • Endometrial adenocarcinoma (HCC)    • Endometrial adenocarcinoma (HCC) 10/26/2019   • Fibrocystic breast changes, bilateral 3/1/2022   • GERD (gastroesophageal reflux disease)    • Hyperlipidemia    • Hypothyroidism      Past Surgical History:  Past Surgical History:   Procedure Laterality Date   • BREAST BIOPSY Right 2022    Benign   • COLONOSCOPY N/A 11/20/2017    Procedure: COLONOSCOPY TO CECUM  WITH COLD BIOPSY POLYPECTOMY;  Surgeon: Nidia Saldana MD;  Location: [facility] ENDOSCOPY;  Service:    • COLONOSCOPY W/ POLYPECTOMY N/A 07/10/2012    Hyperplastic sigmoid poylp-Dr. Nidia Saldana   • D & C HYSTEROSCOPY N/A 11/13/2019    Dr. Jose Perkins   • HYSTERECTOMY  11/20/2019   • WISDOM TOOTH EXTRACTION        Social History:   Social History     Tobacco Use   • Smoking status: Never Smoker   • Smokeless tobacco: Never Used   Substance Use Topics   • Alcohol use: No     Comment: Socially      Family History:  Family History   Problem Relation Age of Onset   • Hyperlipidemia Mother    • Alzheimer's disease Father    •  Prostate cancer Father    • Cancer Daughter         Sarcoma   • Breast cancer Neg Hx        Home Meds:  (Not in a hospital admission)    Current Meds:      Allergies:  No Known Allergies  Review of Systems  The following systems were reviewed and negative;  constitution, respiratory, cardiovascular, musculoskeletal and neurological     Objective     Vital Signs  Temp:  [98 °F (36.7 °C)] 98 °F (36.7 °C)  Heart Rate:  [52-63] 54  Resp:  [18] 18  BP: (166)/(78) 166/78  Physical Exam:  General Appearance:    Alert, cooperative, in no acute distress   Head:    Normocephalic, without obvious abnormality, atraumatic   Eyes:            Lids and lashes normal, conjunctivae and sclerae normal, no   icterus   Throat:   No oral lesions, no thrush, oral mucosa moist   Neck:   No adenopathy, supple, trachea midline, no thyromegaly, no   carotid bruit, no JVD   Lungs:     Clear to auscultation,respirations regular, even and                   unlabored    Heart:    Regular rhythm and normal rate, normal S1 and S2, no            murmur, no gallop, no rub, no click   Chest Wall:    No abnormalities observed   Abdomen:     Normal bowel sounds, no masses, no organomegaly, soft        nontender, nondistended, no guarding, no rebound                 tenderness   Rectal:     Deferred   Extremities:   no edema, no cyanosis, no redness   Skin:   No bleeding, bruising or rash   Lymph nodes:   No palpable adenopathy   Psychiatric:  Judgement and insight: normal   Orientation to person place and time: normal   Mood and affect: normal   Results Review:   I reviewed the patient's new clinical results.    Results from last 7 days   Lab Units 08/14/22  2210   WBC 10*3/mm3 5.73   HEMOGLOBIN g/dL 13.6   HEMATOCRIT % 39.2   PLATELETS 10*3/mm3 164     Results from last 7 days   Lab Units 08/14/22  2210   SODIUM mmol/L 143   POTASSIUM mmol/L 3.9   CHLORIDE mmol/L 106   CO2 mmol/L 23.2   BUN mg/dL 13   CREATININE mg/dL 0.77   CALCIUM mg/dL 9.4   GLUCOSE  mg/dL 107*         No results found for: LIPASE    Radiology:  XR Chest 1 View   Final Result   1. No acute process.       This report was finalized on 8/14/2022 11:27 PM by Dr. Rocky García M.D.              Assessment & Plan   Assessment:   1.  Recurrent esophageal foreign body with a long history of intermittent dysphagia.  2.  She has a long history of heartburn.  3.  She has a history of colon polyps.  Last colonoscopy was 5 years ago by Dr. Zechariah Saldana.  She is due for another colonoscopy by Dr. Saldana.      Plan:   1.  My plan would be to take her to the operating room tonight to remove the esophageal foreign body.  I may dilate the esophagus.    I discussed the patient's findings and my recommendations with patient, family, nursing staff and consulting provider.         Primitivo Mccoy M.D.  [facility]  [address] [place] [address]  Office: [phone]

## 2022-08-15 NOTE — ANESTHESIA PROCEDURE NOTES
Airway  Urgency: elective    Date/Time: 8/15/2022 1:21 AM    General Information and Staff    Patient location during procedure: OR  Anesthesiologist: Edouard Mobley MD    Indications and Patient Condition  Indications for airway management: airway protection    Preoxygenated: yes  Mask difficulty assessment: 1 - vent by mask    Final Airway Details  Final airway type: endotracheal airway      Successful airway: ETT  Cuffed: yes   Successful intubation technique: video laryngoscopy  Blade: CMAC  Blade size: D  ETT size (mm): 6.5  Cormack-Lehane Classification: grade I - full view of glottis  Placement verified by: chest auscultation and capnometry   Measured from: teeth  Number of attempts at approach: 1  Assessment: lips, teeth, and gum same as pre-op and atraumatic intubation

## 2022-08-15 NOTE — DISCHARGE INSTRUCTIONS
Await pathology results, biopsied during EGD procedure.    Take the Protonix as directed.    Chew your food thoroughly and eat slowly.    Follow up with Dr. Mccoy in his office in 3 months, call in the morning to schedule the 3-month appointment.

## 2022-08-15 NOTE — ANESTHESIA POSTPROCEDURE EVALUATION
"Patient: Bhavna Elliott    Procedure Summary     Date: 08/15/22 Room / Location: [facility] OR [address] [place] [address] MAIN OR    Anesthesia Start: 0113 Anesthesia Stop: 0242    Procedure: ESOPHAGOGASTRODUODENOSCOPY WITH REMOVAL OF FOREIGN BODY/FOOD BOLUS (N/A Esophagus) Diagnosis:     Surgeons: Primitivo Mccoy MD Provider: Edouard Mobley MD    Anesthesia Type: general ASA Status: 3 - Emergent          Anesthesia Type: general    Vitals  Vitals Value Taken Time   /80 08/15/22 0301   Temp 36.5 °C (97.7 °F) 08/15/22 0236   Pulse 83 08/15/22 0305   Resp 16 08/15/22 0300   SpO2 98 % 08/15/22 0305   Vitals shown include unvalidated device data.        Post Anesthesia Care and Evaluation    Patient location during evaluation: PHASE II  Patient participation: complete - patient participated  Level of consciousness: awake  Pain management: adequate    Airway patency: patent  Anesthetic complications: No anesthetic complications    Cardiovascular status: acceptable  Respiratory status: acceptable  Hydration status: acceptable    Comments: /82 (BP Location: Left arm, Patient Position: Lying)   Pulse 64   Temp 36.5 °C (97.7 °F) (Oral)   Resp 16   Ht 162.6 cm (64\")   Wt 92.1 kg (203 lb 0.7 oz)   SpO2 97%   BMI 34.85 kg/m²       "

## 2022-08-15 NOTE — ANESTHESIA PREPROCEDURE EVALUATION
Anesthesia Evaluation     Patient summary reviewed and Nursing notes reviewed   NPO Solid Status: Waived due to emergency  NPO Liquid Status: Waived due to emergency           Airway   Mallampati: II  TM distance: >3 FB  Neck ROM: full  Possible difficult intubation and Narrow palate  Dental - normal exam     Pulmonary - negative pulmonary ROS and normal exam   Cardiovascular - normal exam  Exercise tolerance: good (4-7 METS)    (+) hyperlipidemia,       Neuro/Psych- negative ROS  GI/Hepatic/Renal/Endo    (+)  GERD,  thyroid problem hypothyroidism    Musculoskeletal (-) negative ROS    Abdominal    Substance History - negative use     OB/GYN          Other      history of cancer                    Anesthesia Plan    ASA 3 - emergent     general     intravenous induction     Anesthetic plan, risks, benefits, and alternatives have been provided, discussed and informed consent has been obtained with: patient.    Plan discussed with CRNA.        CODE STATUS:

## 2022-08-15 NOTE — ED PROVIDER NOTES
EMERGENCY DEPARTMENT ENCOUNTER    Room Number:  20/20  Date of encounter:  8/14/2022  PCP: Suki Darden MD  Historian: Patient      HPI:  Chief Complaint: Food bolus  A complete HPI/ROS/PMH/PSH/SH/FH are unobtainable due to: None    Context: Bhavna Elliott is a 61 y.o. female who presents to the ED c/o food bolus.  At 5:30 PM, patient was eating pork chop.  She fell to get stuck in the lower part of her presumed esophagus.  Symptoms are constant.  Nothing makes this worse or better.  She cannot tolerate even her saliva that she is spitting it up.      PAST MEDICAL HISTORY  Active Ambulatory Problems     Diagnosis Date Noted   • Hyperlipidemia 04/08/2016   • Impaired glucose tolerance 04/08/2016   • Hypothyroidism 04/08/2016   • Health care maintenance 04/08/2016   • Colon cancer screening 09/08/2017   • History of colon polyps 09/08/2017   • Dysphagia 11/22/2019   • Endometrial adenocarcinoma (HCC) 10/26/2019   • GERD (gastroesophageal reflux disease) 10/27/2019   • History of endometrial cancer 08/06/2020   • Abnormal finding on breast imaging 03/01/2022   • Fibrocystic breast changes, bilateral 03/01/2022   • Mastodynia 03/01/2022     Resolved Ambulatory Problems     Diagnosis Date Noted   • Colon polyp 01/18/2017   • Generalized abdominal pain 05/02/2018     Past Medical History:   Diagnosis Date   • Disease of thyroid gland          PAST SURGICAL HISTORY  Past Surgical History:   Procedure Laterality Date   • BREAST BIOPSY Right 2022    Benign   • COLONOSCOPY N/A 11/20/2017    Procedure: COLONOSCOPY TO CECUM  WITH COLD BIOPSY POLYPECTOMY;  Surgeon: Nidia Saldana MD;  Location: [facility] ENDOSCOPY;  Service:    • COLONOSCOPY W/ POLYPECTOMY N/A 07/10/2012    Hyperplastic sigmoid poylp-Dr. Nidia Saldana   • D & C HYSTEROSCOPY N/A 11/13/2019    Dr. Jose Perkins   • HYSTERECTOMY  11/20/2019   • WISDOM TOOTH EXTRACTION           FAMILY HISTORY  Family History   Problem Relation Age of Onset   •  Hyperlipidemia Mother    • Alzheimer's disease Father    • Prostate cancer Father    • Cancer Daughter         Sarcoma   • Breast cancer Neg Hx          SOCIAL HISTORY  Social History     Socioeconomic History   • Marital status:      Spouse name: Oscar   • Number of children: 2   • Years of education: College   Tobacco Use   • Smoking status: Never Smoker   • Smokeless tobacco: Never Used   Vaping Use   • Vaping Use: Never used   Substance and Sexual Activity   • Alcohol use: No     Comment: Socially   • Drug use: No   • Sexual activity: Defer         ALLERGIES  Patient has no known allergies.        REVIEW OF SYSTEMS  Review of Systems     All systems reviewed and negative except for those discussed in HPI.       PHYSICAL EXAM    I have reviewed the triage vital signs and nursing notes.    ED Triage Vitals [08/14/22 2155]   Temp Heart Rate Resp BP SpO2   98 °F (36.7 °C) 57 18 -- 99 %      Temp src Heart Rate Source Patient Position BP Location FiO2 (%)   Tympanic -- -- -- --       Physical Exam  GENERAL: not distressed  HENT: nares patent  EYES: no scleral icterus  CV: regular rhythm, regular rate  RESPIRATORY: normal effort, clear to auscultation bilaterally  ABDOMEN: soft, nontender  MUSCULOSKELETAL: no deformity  NEURO: alert, moves all extremities, follows commands  SKIN: warm, dry        LAB RESULTS  Recent Results (from the past 24 hour(s))   Basic Metabolic Panel    Collection Time: 08/14/22 10:10 PM    Specimen: Blood   Result Value Ref Range    Glucose 107 (H) 65 - 99 mg/dL    BUN 13 8 - 23 mg/dL    Creatinine 0.77 0.57 - 1.00 mg/dL    Sodium 143 136 - 145 mmol/L    Potassium 3.9 3.5 - 5.2 mmol/L    Chloride 106 98 - 107 mmol/L    CO2 23.2 22.0 - 29.0 mmol/L    Calcium 9.4 8.6 - 10.5 mg/dL    BUN/Creatinine Ratio 16.9 7.0 - 25.0    Anion Gap 13.8 5.0 - 15.0 mmol/L    eGFR 87.3 >60.0 mL/min/1.73   CBC Auto Differential    Collection Time: 08/14/22 10:10 PM    Specimen: Blood   Result Value Ref Range     WBC 5.73 3.40 - 10.80 10*3/mm3    RBC 4.21 3.77 - 5.28 10*6/mm3    Hemoglobin 13.6 12.0 - 15.9 g/dL    Hematocrit 39.2 34.0 - 46.6 %    MCV 93.1 79.0 - 97.0 fL    MCH 32.3 26.6 - 33.0 pg    MCHC 34.7 31.5 - 35.7 g/dL    RDW 12.5 12.3 - 15.4 %    RDW-SD 42.3 37.0 - 54.0 fl    MPV 10.2 6.0 - 12.0 fL    Platelets 164 140 - 450 10*3/mm3    Neutrophil % 59.4 42.7 - 76.0 %    Lymphocyte % 29.0 19.6 - 45.3 %    Monocyte % 7.9 5.0 - 12.0 %    Eosinophil % 3.0 0.3 - 6.2 %    Basophil % 0.5 0.0 - 1.5 %    Immature Grans % 0.2 0.0 - 0.5 %    Neutrophils, Absolute 3.41 1.70 - 7.00 10*3/mm3    Lymphocytes, Absolute 1.66 0.70 - 3.10 10*3/mm3    Monocytes, Absolute 0.45 0.10 - 0.90 10*3/mm3    Eosinophils, Absolute 0.17 0.00 - 0.40 10*3/mm3    Basophils, Absolute 0.03 0.00 - 0.20 10*3/mm3    Immature Grans, Absolute 0.01 0.00 - 0.05 10*3/mm3    nRBC 0.0 0.0 - 0.2 /100 WBC   COVID-19,[facility] IN-HOUSE CEPHEID/JETT NP SWAB IN TRANSPORT MEDIA 8-12 HR TAT - Swab, Nasopharynx    Collection Time: 08/14/22 10:19 PM    Specimen: Nasopharynx; Swab   Result Value Ref Range    COVID19 Not Detected Not Detected - Ref. Range       Ordered the above labs and independently reviewed the results.        RADIOLOGY  No Radiology Exams Resulted Within Past 24 Hours    I ordered the above noted radiological studies. Reviewed by me and discussed with radiologist.  See dictation for official radiology interpretation.      PROCEDURES    Procedures      MEDICATIONS GIVEN IN ER    Medications   sodium chloride 0.9 % flush 10 mL (has no administration in time range)   lactated ringers bolus 1,000 mL (0 mL Intravenous Stopped 8/14/22 2303)   glucagon (human recombinant) (GLUCAGEN DIAGNOSTIC) injection 0.5 mg (0.5 mg Intravenous Given 8/14/22 2214)   ondansetron (ZOFRAN) injection 4 mg (4 mg Intravenous Given 8/14/22 2214)         PROGRESS, DATA ANALYSIS, CONSULTS, AND MEDICAL DECISION MAKING    All labs have been independently reviewed by me.  All radiology  studies have been reviewed by me and discussed with radiologist dictating the report.   EKG's independently viewed and interpreted by me.  Discussion below represents my analysis of pertinent findings related to patient's condition, differential diagnosis, treatment plan and final disposition.        ED Course as of 08/14/22 2319   Sun Aug 14, 2022   2319 Creatinine: 0.77 [TD]   2319 Sodium: 143 [TD]   2319 Potassium: 3.9 [TD]   2319 COVID19: Not Detected [TD]   2319 WBC: 5.73 [TD]      ED Course User Index  [TD] Austen Das II, MD       I discussed the case with Dr. Mccoy, gastroenterology.  We read patient's history and lack of response to glucagon.  He will take the patient to the OR.    PPE: The patient wore a surgical mask throughout the entire patient encounter. I wore an N95.    AS OF 23:19 EDT VITALS:    BP - 166/78  HR - 54  TEMP - 98 °F (36.7 °C) (Tympanic)  O2 SATS - 100%        DIAGNOSIS  Final diagnoses:   Esophageal obstruction due to food impaction         DISPOSITION  Sent to OR           Austen Das II, MD  08/14/22 2319

## 2022-08-19 ENCOUNTER — TELEPHONE (OUTPATIENT)
Dept: GASTROENTEROLOGY | Facility: CLINIC | Age: 62
End: 2022-08-19

## 2022-08-19 NOTE — PROGRESS NOTES
08/18/22       Tell her that pathology from the EGD showed inflammation at the distal esophagus. I think that this is from GERD and from the fact that the piece of stuck pork irritated that area of the distal esophagus. I would continue the Protonix. I hope that she can swallow better since I dilated her distal esophagus. I do not think that she has eosinophilic esophagitis because not many eosinophils were seen in the proximal esophageal biopsies.         Have her f/u with Ms. Perry in 3 mos.         Send a copy of this report to her PCP.     Jhon caputo

## 2022-08-19 NOTE — TELEPHONE ENCOUNTER
----- Message from Primitivo Mccoy MD sent at 8/18/2022  8:32 PM EDT -----  08/18/22       Tell her that pathology from the EGD showed inflammation at the distal esophagus. I think that this is from GERD and from the fact that the piece of stuck pork irritated that area of the distal esophagus. I would continue the Protonix. I hope that she can swallow better since I dilated her distal esophagus. I do not think that she has eosinophilic esophagitis because not many eosinophils were seen in the proximal esophageal biopsies.         Have her f/u with Ms. Perry in 3 mos.         Send a copy of this report to her PCP.          Jhon caputo

## 2022-08-19 NOTE — TELEPHONE ENCOUNTER
Called pt and advised of Dr Mccoy's note. Verb understanding.     Pt has f/u scheduled for 11/16 with Dr Mccoy.     Results sen to Dr Darden thru epic.

## 2022-09-26 ENCOUNTER — TELEPHONE (OUTPATIENT)
Dept: SURGERY | Facility: CLINIC | Age: 62
End: 2022-09-26

## 2022-09-26 NOTE — TELEPHONE ENCOUNTER
Pt r/s appt with ROBLES Reddy for Mon 10/24/2022 at 2:30 pm.    Spoke with pt lakesha grant.    MEB

## 2022-10-03 RX ORDER — LEVOTHYROXINE SODIUM 112 UG/1
TABLET ORAL
Qty: 90 TABLET | Refills: 1 | Status: SHIPPED | OUTPATIENT
Start: 2022-10-03 | End: 2023-01-13 | Stop reason: SDUPTHER

## 2022-10-05 ENCOUNTER — HOSPITAL ENCOUNTER (OUTPATIENT)
Dept: ULTRASOUND IMAGING | Facility: HOSPITAL | Age: 62
Discharge: HOME | End: 2022-10-05

## 2022-10-05 ENCOUNTER — HOSPITAL ENCOUNTER (OUTPATIENT)
Dept: MAMMOGRAPHY | Facility: HOSPITAL | Age: 62
Discharge: HOME | End: 2022-10-05

## 2022-10-05 DIAGNOSIS — R92.8 ABNORMAL FINDING ON BREAST IMAGING: ICD-10-CM

## 2022-10-05 PROCEDURE — 76642 ULTRASOUND BREAST LIMITED: CPT

## 2022-10-05 PROCEDURE — 77066 DX MAMMO INCL CAD BI: CPT

## 2022-10-05 PROCEDURE — G0279 TOMOSYNTHESIS, MAMMO: HCPCS

## 2022-10-24 ENCOUNTER — OFFICE VISIT (OUTPATIENT)
Dept: SURGERY | Facility: CLINIC | Age: 62
End: 2022-10-24

## 2022-10-24 VITALS
BODY MASS INDEX: 34.66 KG/M2 | SYSTOLIC BLOOD PRESSURE: 138 MMHG | WEIGHT: 203 LBS | HEIGHT: 64 IN | DIASTOLIC BLOOD PRESSURE: 80 MMHG

## 2022-10-24 DIAGNOSIS — N64.4 MASTODYNIA: ICD-10-CM

## 2022-10-24 DIAGNOSIS — N60.12 FIBROCYSTIC BREAST CHANGES, BILATERAL: ICD-10-CM

## 2022-10-24 DIAGNOSIS — R92.8 ABNORMAL FINDING ON BREAST IMAGING: Primary | ICD-10-CM

## 2022-10-24 DIAGNOSIS — N60.11 FIBROCYSTIC BREAST CHANGES, BILATERAL: ICD-10-CM

## 2022-10-24 PROCEDURE — 99213 OFFICE O/P EST LOW 20 MIN: CPT | Performed by: NURSE PRACTITIONER

## 2022-10-24 RX ORDER — PANTOPRAZOLE SODIUM 40 MG/1
40 TABLET, DELAYED RELEASE ORAL DAILY
COMMUNITY
Start: 2022-08-15 | End: 2023-01-18

## 2022-10-24 NOTE — PROGRESS NOTES
[facility]     Referring Provider: Suki Darden MD     Chief complaint: abnormal breast imaging     HPI: Ms. Bhavna Elliott is a 62 yo woman, seen at the request of Suki Darden MD, for abnormal breast imaging and right breast pain  I personally reviewed her records and summarized her relevant breast history/imaging:  She has a personal history of recent benign right breast biopsy in 12/21.         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 (T1aN0i+M0) treated in 2019.     12/10/2021:  Clinic visit with Suki Darden MD  breast pain-patient had screening mammogram on 10/20/2021.  She had a diagnostic breast mammogram on 12/2.  It shows suspicious 0.9 cm right breast mass.  She had a biopsy 2 days ago.  It came back negative for cancer.  She is scheduled for office visit with Dr. Mims.  Breast pain-advised to decrease amount of caffeine.  She will follow up with Dr. Mims as scheduled  8/10/2020: Bilateral screening mammogram with tomosynthesis at [facility]  FINDINGS: Bilateral digital CC and MLO mammographic and digital Tomosynthesis images were obtained. Comparison is made to prior studies dated 3/4/2019 and 2/9/2017 .   Scattered fibroglandular densities are seen throughout both breasts in a pattern which is unchanged. I see no new or dominant masses, areas of architectural distortion or skin thickening. There is no evidence for axillary lymphadenopathy or nipple retraction.   IMPRESSION:  1. There is no evidence for malignancy or significant change in either breast. Routine followup mammography is recommended.  BI-RADS category 1: Negative.  10/20/2021: Screening mammogram with tomosynthesis at [facility]  FINDINGS: Bilateral digital CC and MLO mammographic and Tomosynthesis images were obtained. Comparison is made to prior examinations dated  08/10/2020, 03/04/2019 and 02/09/2017.   The parenchyma of both breasts is largely fatty-replaced. Within the posterior one third lateral aspect of the right breast projecting at the level of the nipple there is an area of focal asymmetry. I see no suspicious calcifications or areas of  architectural distortion in either breast. There is no evidence for skin thickening, nipple retraction or axillary adenopathy.   IMPRESSION:  1. There is an area of focal asymmetry in the posterior one third lateral aspect of the right breast. Further evaluation with spot compression CC and MLO mammographic and Tomosynthesis images and targeted right breast sonography is recommended.  2. There are no findings suspicious for malignancy in the left breast.   BI-RADS Category 0: Incomplete. Needs additional imaging evaluation.  12/2/2021: Right diagnostic mammogram with tomosynthesis, right breast limited ultrasound at [facility]  There are scattered areas of fibroglandular density.    There is a persistent 0.9 cm mass with obscured margins in the upper outer posterior right breast. This area was further assessed with ultrasound.    ULTRASOUND:  Targeted sonographic evaluation of the right breast was performed from 10:00 to 11:00 in the region of the mammographic mass. At 10:00, 4 cm from the nipple, there is a 0.8 x 0.2 x 0.4 cm oval hypoechoic mass with an adjacent blood vessel, which is probably benign and felt to reflect a lymph node. This is likely incidental to the mass on mammogram as no  dominant blood vessel is identified adjacent to the mass on mammogram.  At 7:00, 2 cm from the nipple, there is an incidental 0.2 x 0.2 x 0.4 cm benign-appearing cyst. No definite sonographic correlate is identified for the mammographic mass.   IMPRESSION:  1.  Suspicious 0.9 cm mass in the upper outer posterior right breast without a sonographic correlate. Recommend further evaluation with stereotactic/Tomosynthesis guided core  needle biopsy.  2.  A 0.8 cm mass at 10:00 in the right breast is probably benign and favored to reflect an intramammary lymph node. If the above pathology results are benign, short-term follow-up targeted right breast ultrasound would be recommended in 3 months to document short-term stability.   Findings and recommendations were discussed with the patient in person at the time of her examination. An Epic in basket message was sent to Dr. Darden on 12/2/2021.  BI-RADS Category 4: Suspicious  12/8/2021: Right breast stereotactic biopsy at [facility]  PROCEDURE NOTE: Informed consent was obtained. Preliminary Tomosynthesis images of the right breast were obtained. The overlying skin was prepped in the usual sterile fashion. Local anesthesia was achieved with 1%  lidocaine. A nick was made in the skin with a scalpel. Through the nick was inserted an 8 gauge Mammotome vacuum assisted device. Repeat stereotactic/Tomosynthesis images were obtained demonstrating adequate placement of the biopsy needle. Multiple tissue specimens were obtained.  A specimen radiograph was obtained demonstrating a focal area of increased density within the specimen. A bowtie-shaped metallic clip was placed to james the site. Pressure was applied until bleeding subsided and the overlying skin was cleaned and bandaged. Postbiopsy mammography of the right breast demonstrates placement of a bowtie-shaped metallic clip at the 9 o'clock position.     The pathology result has returned as clustered apocrine cysts with florid usual ductal hyperplasia and adenosis with microcalcifications. This is concordant with imaging findings.   IMPRESSION:  Technically successful Tomosynthesis guided Mammotome vacuum assisted right breast biopsy with placement of a metallic clip. The pathology result has returned as benign. Routine clinical and imaging follow-up is appropriate.    12/8/2021: pathology right breast biopsy  1. Right Breast, 9 o'clock,  Stereotactic Biopsies for a Mass:  A. Clustered apocrine cysts, florid usual ductal hyperplasia and adenosis with associated microcalcifications.  B. No atypical hyperplasia, in situ, nor invasive carcinoma identified    3/1/2022:  Today she presents with only mild right breast discomfort. Shortly after the mammogram in 10/21, she had fairly severe right breast discomfort, a sharp pain, deep in her chest near the right nipple.  The pain stopped completely after the biopsy in 12/21 but has been recurring to a lesser degree recently.    She denies any breast lumps, skin changes, or nipple discharge.  She was by herself in clinic today.     10/24/22, Interval History:  She returns today for follow up with no breast complaints.    Right US on 3/2/22 was stable, BiRads 3.  (see full report below)    On 10/5/22, bilateral diagnostic mammogram with tomosynthesis, right limited US was completed with stable findings, BiRads 2.  (see full report below)        Review of Systems   HENT:   Positive for tinnitus.    Endocrine: Positive for hot flashes.   All other systems reviewed and are negative.      Medications:    Current Outpatient Medications:   •  levothyroxine (SYNTHROID, LEVOTHROID) 112 MCG tablet, TAKE 1 TABLET BY MOUTH EVERY DAY, Disp: 90 tablet, Rfl: 1  •  pantoprazole (PROTONIX) 40 MG EC tablet, Take 1 tablet by mouth Daily., Disp: , Rfl:   •  rosuvastatin (CRESTOR) 10 MG tablet, TAKE 1 TABLET BY MOUTH EVERY DAY, Disp: 90 tablet, Rfl: 1    Allergies:  No Known Allergies    Medical history:  Past Medical History:   Diagnosis Date   • Colon polyp    • Disease of thyroid gland    • Endometrial adenocarcinoma (HCC)    • Endometrial adenocarcinoma (HCC) 10/26/2019   • Fibrocystic breast changes, bilateral 3/1/2022   • GERD (gastroesophageal reflux disease)    • Hyperlipidemia    • Hypothyroidism        Surgical History:  Past Surgical History:   Procedure Laterality Date   • BREAST BIOPSY Right 2022    Benign   •  "COLONOSCOPY N/A 2017    Procedure: COLONOSCOPY TO CECUM  WITH COLD BIOPSY POLYPECTOMY;  Surgeon: Nidia Saldana MD;  Location: [facility] ENDOSCOPY;  Service:    • COLONOSCOPY W/ POLYPECTOMY N/A 07/10/2012    Hyperplastic sigmoid poylp-Dr. Nidia Saldana   • D & C HYSTEROSCOPY N/A 2019    Dr. Jose Perkins   • ENDOSCOPY N/A 8/15/2022    Procedure: ESOPHAGOGASTRODUODENOSCOPY WITH REMOVAL OF FOREIGN BODY/FOOD BOLUS;  Surgeon: Primitivo Mccoy MD;  Location: [facility] MAIN OR;  Service: Gastroenterology;  Laterality: N/A;   • HYSTERECTOMY  2019   • WISDOM TOOTH EXTRACTION         Family History:  Family History   Problem Relation Age of Onset   • Hyperlipidemia Mother    • Alzheimer's disease Father    • Prostate cancer Father    • Cancer Daughter         Sarcoma   • Breast cancer Neg Hx        Social History:   Social History     Socioeconomic History   • Marital status:      Spouse name: Oscar   • Number of children: 2   • Years of education: College   Tobacco Use   • Smoking status: Never   • Smokeless tobacco: Never   Vaping Use   • Vaping Use: Never used   Substance and Sexual Activity   • Alcohol use: No     Comment: Socially   • Drug use: No   • Sexual activity: Defer     Patient drinks 2 servings of caffeine per day.       GYNECOLOGIC HISTORY:   . P: 2. AB: 2.  Last menstrual period: age 54  Age at menarche: 15  Age at first childbirth: 29  Lactation/How lon months  Age at menopause: 54  Total years of oral contraceptive use: 20  Total years of hormone replacement therapy: n/a      Physical Exam  /80 (BP Location: Right arm)   Ht 162.6 cm (64\")   Wt 92.1 kg (203 lb)   BMI 34.84 kg/m²       Ht 162.6 cm (64\")   Wt 92.1 kg (203 lb)   BMI 34.84 kg/m²     ECOG 0 - Asymptomatic  General: NAD, well appearing  Psych: a&o x 3, normal mood and affect  MSK: normal gait, normal ROM in bilateral shoulders  Lymph nodes:  no cervical, supraclavicular or axillary lymphadenopathy  Breast: " symmetric, mild ptosis  Right:  No visible abnormalities on inspection while seated, with arms raised or hands on hips. No masses, skin changes, or nipple abnormalities.  No tenderness reported with exam today.  Left:  No visible abnormalities on inspection while seated, with arms raised or hands on hips. No masses, skin changes, or nipple abnormalities.    Imaging:  3/2/2022: right breast limited US at [facility]  FINDINGS:  Targeted sonographic of the right breast was performed for follow-up. At 10-11 o'clock, 4 from the nipple, there is a 0.8 x 0.2 x 0.4 cm hypoechoic mass, which is not significantly changed from 12/2/2021, previously 0.8 x 0.2 x 0.4 cm. This is probably benign.   IMPRESSION:  No significant change in a probably benign right breast mass dating back to 12/2/2021. Recommend follow-up targeted right breast ultrasound at the time of the patient's annual bilateral diagnostic mammogram in October 2022.   BI-RADS Category 3: Probably benign    10/5/2022: bilateral diagnostic mammogram with tomosynthesis, right limited US at [facility]  FINDINGS: Bilateral digital CC and MLO mammographic and digitalTomosynthesis images were obtained. Comparison is made to prior studiesdated 10/20/2021, 8/10/2020 and 3/4/2019 .   The parenchyma of bothbreasts is largely fatty-replaced. I see no new or dominant masses,areas of architectural distortion or skin thickening. There is a biopsyclip seen adjacent to an area of focal asymmetry in the posterior one  third lateral aspect of the right breast and returned benign resultsfollowing biopsy. There is no evidence for axillary lymphadenopathy ornipple retraction.   ULTRASOUND: Targeted sonographic evaluation of the right breast was performed through the 10 to 11:00 position on the order of 4 cm from the nipple. Comparison is made to prior right breast sonography dated 3/2/2022. At this location there is a stable 0.8 x 0.2 x 0.4 cm oval circumscribed isoechoic masslike lesion that is  consistent with a benign  etiology, likely a fat lobule and/or area of fibrous tissue.  IMPRESSION:  1. There is no evidence for malignancy or significant change in either breast. Routine followup mammography is recommended.   BI-RADS category 2: Benign.    Assessment:    1)  61 y.o. F with biopsy proven benign right breast calcifications (12/2021), routine follow up recommended    2)  Abnormal sonographic imaging right breast for which short term follow up is recommended (12/2021), stable 10/2022 ending surveillance.  A 0.8 cm mass at 10:00 in the right breast is probably benign and favored to reflect an intramammary lymph node.    3)  Benign breast changes    4)  Mild right breast mastodynia, resolved    Discussion:  Imaging results from 10/2022 were reviewed, stable imaging right breast, ending surveillance with routine follow up  She will be due her screening mammogram in 12 months.    Her pain has resolved, she will continue to monitor    Plan:    In view of her normal imaging and examination I will not give her a follow-up in our office.  I  have asked her to check her self-exam and to call us in the interim with any concerns and I would be happy to see her back.    She will be due her screening  Mammogram in 12 months.    I have advised the patient to continue monthly breast self examination.  She was also advised to notify us if she develops new breast symptoms.       Arianne Dobson, ROBLES           CC:  No ref. provider found  Suki Darden MD    EMR Dragon/transcription disclaimer:  Dictated using Dragon dictation

## 2022-11-11 ENCOUNTER — TELEPHONE (OUTPATIENT)
Dept: GASTROENTEROLOGY | Facility: CLINIC | Age: 62
End: 2022-11-11

## 2023-01-03 NOTE — ED TRIAGE NOTES
To ER via PV.  C/o food bolus.  States it is pork chop.  Pt has had this issue in the past.  Unable to tolerate secretions.      Pt in mask at time of triage.  Triage staff in appropriate PPE.   
HTN (hypertension)

## 2023-01-06 ENCOUNTER — LAB (OUTPATIENT)
Dept: LAB | Facility: HOSPITAL | Age: 63
End: 2023-01-06
Payer: COMMERCIAL

## 2023-01-06 PROCEDURE — 84443 ASSAY THYROID STIM HORMONE: CPT | Performed by: FAMILY MEDICINE

## 2023-01-06 PROCEDURE — 80061 LIPID PANEL: CPT | Performed by: FAMILY MEDICINE

## 2023-01-06 PROCEDURE — 80053 COMPREHEN METABOLIC PANEL: CPT | Performed by: FAMILY MEDICINE

## 2023-01-06 PROCEDURE — 83036 HEMOGLOBIN GLYCOSYLATED A1C: CPT | Performed by: FAMILY MEDICINE

## 2023-01-13 ENCOUNTER — OFFICE VISIT (OUTPATIENT)
Dept: INTERNAL MEDICINE | Facility: CLINIC | Age: 63
End: 2023-01-13
Payer: COMMERCIAL

## 2023-01-13 VITALS
TEMPERATURE: 96.9 F | BODY MASS INDEX: 34.83 KG/M2 | OXYGEN SATURATION: 96 % | HEART RATE: 84 BPM | DIASTOLIC BLOOD PRESSURE: 70 MMHG | SYSTOLIC BLOOD PRESSURE: 130 MMHG | WEIGHT: 204 LBS | HEIGHT: 64 IN

## 2023-01-13 DIAGNOSIS — E03.9 ACQUIRED HYPOTHYROIDISM: ICD-10-CM

## 2023-01-13 DIAGNOSIS — E78.5 HYPERLIPIDEMIA, UNSPECIFIED HYPERLIPIDEMIA TYPE: ICD-10-CM

## 2023-01-13 DIAGNOSIS — C54.1 ENDOMETRIAL CA: ICD-10-CM

## 2023-01-13 DIAGNOSIS — R73.02 IMPAIRED GLUCOSE TOLERANCE: Primary | ICD-10-CM

## 2023-01-13 DIAGNOSIS — Z12.11 SCREENING FOR COLON CANCER: ICD-10-CM

## 2023-01-13 PROCEDURE — 99214 OFFICE O/P EST MOD 30 MIN: CPT | Performed by: FAMILY MEDICINE

## 2023-01-13 RX ORDER — LEVOTHYROXINE SODIUM 112 UG/1
112 TABLET ORAL DAILY
Qty: 90 TABLET | Refills: 3 | Status: SHIPPED | OUTPATIENT
Start: 2023-01-13

## 2023-01-13 RX ORDER — ROSUVASTATIN CALCIUM 10 MG/1
10 TABLET, COATED ORAL DAILY
Qty: 90 TABLET | Refills: 1 | Status: SHIPPED | OUTPATIENT
Start: 2023-01-13

## 2023-01-13 NOTE — PROGRESS NOTES
Subjective   Bhavna Elliott is a 62 y.o. female.   Chief Complaint   Patient presents with   • Hyperlipidemia   • Hypothyroidism   • Hyperglycemia       History of Present Illness        #1 hyperlipidemia-diagnosed years ago.  Patient is on Crestor 10 mg a day. She takes it everyday.  No muscle aches, no muscle cramps.  She walks 3-4 times a week for 3 to 4 miles.  Creatinine at 0.66, GFR 98.7.  She does not use tobacco products.  She never did.       #2 impaired fasting glucose-weight is stable.  No family history of diabetes.  Fasting blood sugar 106, A1c at 6.5 from 6.2 from 6.1.  BMI 35.    #3 hypothyroidism-patient has no history of thyroid surgery.  She is on levothyroxine at 112 µg a day.  She takes 1 tablet 6 days a week, half tablet on Sundays. She takes it everyday on empty stomach and does not eat for 30 minutes after taking it.  TSH is at 2.27.    Endometrial cancer-she is 3 years from diagnosis.  She did not see her Gyn dr Chapman for about 5 years.  Her oncologist wanted her to follow-up with GYN.    The following portions of the patient's history were reviewed and updated as appropriate: allergies, current medications, past family history, past medical history, past social history, past surgical history and problem list.    Review of Systems   Respiratory: Negative for shortness of breath.    Cardiovascular: Negative for chest pain and palpitations.   Neurological: Negative for light-headedness.         Objective   Wt Readings from Last 3 Encounters:   01/13/23 92.5 kg (204 lb)   10/24/22 92.1 kg (203 lb)   08/15/22 92.1 kg (203 lb 0.7 oz)      Vitals:    01/13/23 1052   BP: 130/70   Pulse: 84   Temp: 96.9 °F (36.1 °C)   SpO2: 96%     Temp Readings from Last 3 Encounters:   01/13/23 96.9 °F (36.1 °C)   08/15/22 97.8 °F (36.6 °C) (Oral)   07/25/22 97.1 °F (36.2 °C)     BP Readings from Last 3 Encounters:   01/13/23 130/70   10/24/22 138/80   08/15/22 141/82     Pulse Readings from Last 3 Encounters:    01/13/23 84   08/15/22 64   07/25/22 53     Body mass index is 35 kg/m².    Physical Exam  Constitutional:       Appearance: She is well-developed.   Neck:      Thyroid: No thyromegaly.      Vascular: No carotid bruit.   Cardiovascular:      Rate and Rhythm: Normal rate and regular rhythm.      Heart sounds: Normal heart sounds.   Pulmonary:      Effort: Pulmonary effort is normal.      Breath sounds: Normal breath sounds.   Skin:     General: Skin is warm and dry.   Neurological:      Mental Status: She is alert and oriented to person, place, and time.   Psychiatric:         Behavior: Behavior normal.         Assessment & Plan   Diagnoses and all orders for this visit:    1. Impaired glucose tolerance (Primary)  -     Hemoglobin A1c    2. Hyperlipidemia, unspecified hyperlipidemia type    3. Acquired hypothyroidism    4. Endometrial ca (HCC)  -     Ambulatory Referral to Gynecology    5. Screening for colon cancer  -     Ambulatory Referral to General Surgery    Other orders  -     rosuvastatin (CRESTOR) 10 MG tablet; Take 1 tablet by mouth Daily.  Dispense: 90 tablet; Refill: 1  -     levothyroxine (SYNTHROID, LEVOTHROID) 112 MCG tablet; Take 1 tablet by mouth Daily.  Dispense: 90 tablet; Refill: 3        1.  Hyperlipidemia-continue current treatment.  Follow-up in 6 months.  2.  Impaired fasting glucose-worsening.  A1c at 6.5.  We will rechecking it today.  Information on counting carbs for diabetes provided.  She is advised to decrease portion size.  Follow-up in 6 months, or sooner if A1c improved at 6.5.  3.  Hypothyroidism-continue current treatment.  Follow-up in 12 months.

## 2023-01-18 ENCOUNTER — OFFICE VISIT (OUTPATIENT)
Dept: GASTROENTEROLOGY | Facility: CLINIC | Age: 63
End: 2023-01-18
Payer: COMMERCIAL

## 2023-01-18 VITALS
HEIGHT: 64 IN | HEART RATE: 68 BPM | TEMPERATURE: 97.5 F | BODY MASS INDEX: 35.03 KG/M2 | WEIGHT: 205.2 LBS | SYSTOLIC BLOOD PRESSURE: 131 MMHG | DIASTOLIC BLOOD PRESSURE: 85 MMHG | OXYGEN SATURATION: 98 %

## 2023-01-18 DIAGNOSIS — Z86.010 HISTORY OF COLON POLYPS: Primary | ICD-10-CM

## 2023-01-18 DIAGNOSIS — K21.9 GASTROESOPHAGEAL REFLUX DISEASE, UNSPECIFIED WHETHER ESOPHAGITIS PRESENT: ICD-10-CM

## 2023-01-18 PROCEDURE — 99214 OFFICE O/P EST MOD 30 MIN: CPT | Performed by: INTERNAL MEDICINE

## 2023-01-18 RX ORDER — PANTOPRAZOLE SODIUM 40 MG/1
40 TABLET, DELAYED RELEASE ORAL DAILY
Qty: 90 TABLET | Refills: 3 | Status: SHIPPED | OUTPATIENT
Start: 2023-01-18

## 2023-01-18 NOTE — PROGRESS NOTES
Chief Complaint   Patient presents with   • EGD       History of Present Illness:   62 y.o. female        In 8 of 2022 she got a piece of meat stuck in her esophagus which were required me to do an EGD with removal of the esophageal foreign body.  I then dilated the distal esophagus.       She does have a history of colon polyps.  Dr. Zechariah Saldana did a colonoscopy about 5 years ago on her.  She is due for another colonoscopy now.       NO dysphagia. Protonix 40 mg/day works better than the Pepcid. She wants to get off of Pepcid and retry Protonix 40 mg/day.     Past Medical History:   Diagnosis Date   • Abel esophagus 2019   • Colon polyp    • Disease of thyroid gland    • Endometrial adenocarcinoma (HCC)    • Endometrial adenocarcinoma (HCC) 10/26/2019   • Fibrocystic breast changes, bilateral 03/01/2022   • GERD (gastroesophageal reflux disease)    • Hyperlipidemia    • Hypothyroidism        Past Surgical History:   Procedure Laterality Date   • BREAST BIOPSY Right 2022    Benign   • COLONOSCOPY N/A 11/20/2017    Procedure: COLONOSCOPY TO CECUM  WITH COLD BIOPSY POLYPECTOMY;  Surgeon: Nidia Saldana MD;  Location: [facility] ENDOSCOPY;  Service:    • COLONOSCOPY W/ POLYPECTOMY N/A 07/10/2012    Hyperplastic sigmoid poylp-Dr. Nidia Saldana   • D & C HYSTEROSCOPY N/A 11/13/2019    Dr. Jose Perkins   • ENDOSCOPY N/A 08/15/2022    Procedure: ESOPHAGOGASTRODUODENOSCOPY WITH REMOVAL OF FOREIGN BODY/FOOD BOLUS;  Surgeon: Primitivo Mccoy MD;  Location: [facility] OR;  Service: Gastroenterology;  Laterality: N/A;   • HYSTERECTOMY  11/20/2019   • UPPER GASTROINTESTINAL ENDOSCOPY  2022   • WISDOM TOOTH EXTRACTION           Current Outpatient Medications:   •  levothyroxine (SYNTHROID, LEVOTHROID) 112 MCG tablet, Take 1 tablet by mouth Daily., Disp: 90 tablet, Rfl: 3  •  rosuvastatin (CRESTOR) 10 MG tablet, Take 1 tablet by mouth Daily., Disp: 90 tablet, Rfl: 1  •  pantoprazole (PROTONIX) 40 MG EC tablet, Take 1 tablet by  mouth Daily., Disp: 90 tablet, Rfl: 3    No Known Allergies    Family History   Problem Relation Age of Onset   • Hyperlipidemia Mother    • Alzheimer's disease Father    • Prostate cancer Father    • Cancer Daughter         Sarcoma   • Breast cancer Neg Hx        Social History     Socioeconomic History   • Marital status:      Spouse name: Oscar   • Number of children: 2   • Years of education: College   Tobacco Use   • Smoking status: Never   • Smokeless tobacco: Never   Vaping Use   • Vaping Use: Never used   Substance and Sexual Activity   • Alcohol use: No     Comment: Socially   • Drug use: No   • Sexual activity: Not Currently     Partners: Male     Birth control/protection: Hysterectomy       Review of Systems   Gastrointestinal: Negative for abdominal pain.   All other systems reviewed and are negative.    Pertinent positives and negatives documented in the HPI and all other systems reviewed and were found to be negative.  Vitals:    01/18/23 1555   BP: 131/85   Pulse: 68   Temp: 97.5 °F (36.4 °C)   SpO2: 98%       Physical Exam  Vitals reviewed.   Constitutional:       General: She is not in acute distress.     Appearance: Normal appearance. She is well-developed. She is obese. She is not diaphoretic.   HENT:      Head: Normocephalic and atraumatic. Hair is normal.      Right Ear: Hearing, tympanic membrane, ear canal and external ear normal. No decreased hearing noted. No drainage.      Left Ear: Hearing, tympanic membrane, ear canal and external ear normal. No decreased hearing noted.      Nose: Nose normal. No nasal deformity.      Mouth/Throat:      Mouth: Mucous membranes are moist.   Eyes:      General: Lids are normal.         Right eye: No discharge.         Left eye: No discharge.      Extraocular Movements: Extraocular movements intact.      Conjunctiva/sclera: Conjunctivae normal.      Pupils: Pupils are equal, round, and reactive to light.   Neck:      Thyroid: No thyromegaly.       Vascular: No JVD.      Trachea: No tracheal deviation.   Cardiovascular:      Rate and Rhythm: Normal rate and regular rhythm.      Pulses: Normal pulses.      Heart sounds: Normal heart sounds. No murmur heard.    No friction rub. No gallop.   Pulmonary:      Effort: Pulmonary effort is normal. No respiratory distress.      Breath sounds: Normal breath sounds. No wheezing or rales.   Chest:      Chest wall: No tenderness.   Abdominal:      General: Bowel sounds are normal. There is no distension.      Palpations: Abdomen is soft. There is no mass.      Tenderness: There is no abdominal tenderness. There is no guarding or rebound.      Hernia: No hernia is present.   Musculoskeletal:         General: No tenderness or deformity. Normal range of motion.      Cervical back: Normal range of motion and neck supple.   Lymphadenopathy:      Cervical: No cervical adenopathy.   Skin:     General: Skin is warm and dry.      Findings: No erythema or rash.   Neurological:      Mental Status: She is alert and oriented to person, place, and time.      Cranial Nerves: No cranial nerve deficit.      Motor: No abnormal muscle tone.      Coordination: Coordination normal.      Deep Tendon Reflexes: Reflexes are normal and symmetric. Reflexes normal.   Psychiatric:         Mood and Affect: Mood normal.         Behavior: Behavior normal.         Thought Content: Thought content normal.         Judgment: Judgment normal.         Diagnoses and all orders for this visit:    1. History of colon polyps (Primary)  -     Case Request; Standing  -     Case Request    2. Gastroesophageal reflux disease, unspecified whether esophagitis present  -     pantoprazole (PROTONIX) 40 MG EC tablet; Take 1 tablet by mouth Daily.  Dispense: 90 tablet; Refill: 3    Other orders  -     Follow Anesthesia Guidelines / Protocol; Future  -     Obtain Informed Consent; Future  -     Implement Anesthesia orders day of procedure.; Standing  -     Obtain informed  consent; Standing  -     Verify bowel prep was successful; Standing  -     Give tap water enema if bowel prep was insufficient; Standing      Assessment:  1.  History of colon polyps.  Her last colonoscopy was about 5 years ago when Dr. Zechariah Saldana did a colonoscopy.  2.  Esophageal reflux disease with a history of an esophageal foreign body requiring the esophageal foreign body to be removed in the emergency room in 8 of 2022.  3.    Recommendations:  1. Protonix 40 mg/day.   2. Colonoscopy.  3. We discussed pros and cons of chronic PPI use.  4. Lose weight.   5.     No follow-ups on file.    Primitivo Mccoy MD  1/18/2023

## 2023-01-19 ENCOUNTER — TELEPHONE (OUTPATIENT)
Dept: GASTROENTEROLOGY | Facility: CLINIC | Age: 63
End: 2023-01-19
Payer: COMMERCIAL

## 2023-01-19 NOTE — TELEPHONE ENCOUNTER
OK FOR HUB TO READ   NAVARRO patient via telephone for COLONOSCOPY. Scheduled 4/28/23 with arrival time of 0100. Prep paperwork mailed to verified address on file. Patient advised arrival time may change based on [facility] guidelines. NAVARRO DUNCAN

## 2023-02-06 ENCOUNTER — TRANSCRIBE ORDERS (OUTPATIENT)
Dept: ADMINISTRATIVE | Facility: HOSPITAL | Age: 63
End: 2023-02-06
Payer: COMMERCIAL

## 2023-02-06 DIAGNOSIS — Z78.0 MENOPAUSE: ICD-10-CM

## 2023-04-12 ENCOUNTER — TELEPHONE (OUTPATIENT)
Dept: GASTROENTEROLOGY | Facility: CLINIC | Age: 63
End: 2023-04-12

## 2023-04-28 ENCOUNTER — ANESTHESIA EVENT (OUTPATIENT)
Dept: GASTROENTEROLOGY | Facility: HOSPITAL | Age: 63
End: 2023-04-28
Payer: COMMERCIAL

## 2023-04-28 ENCOUNTER — ANESTHESIA (OUTPATIENT)
Dept: GASTROENTEROLOGY | Facility: HOSPITAL | Age: 63
End: 2023-04-28
Payer: COMMERCIAL

## 2023-04-28 ENCOUNTER — HOSPITAL ENCOUNTER (OUTPATIENT)
Facility: HOSPITAL | Age: 63
Setting detail: HOSPITAL OUTPATIENT SURGERY
Discharge: HOME | End: 2023-04-28
Attending: INTERNAL MEDICINE | Admitting: INTERNAL MEDICINE
Payer: COMMERCIAL

## 2023-04-28 VITALS
HEIGHT: 64 IN | WEIGHT: 196.6 LBS | BODY MASS INDEX: 33.57 KG/M2 | OXYGEN SATURATION: 99 % | SYSTOLIC BLOOD PRESSURE: 114 MMHG | DIASTOLIC BLOOD PRESSURE: 58 MMHG | RESPIRATION RATE: 14 BRPM | HEART RATE: 68 BPM

## 2023-04-28 DIAGNOSIS — Z86.010 HISTORY OF COLON POLYPS: ICD-10-CM

## 2023-04-28 PROCEDURE — 88305 TISSUE EXAM BY PATHOLOGIST: CPT | Performed by: INTERNAL MEDICINE

## 2023-04-28 PROCEDURE — 25010000002 PROPOFOL 10 MG/ML EMULSION: Performed by: NURSE ANESTHETIST, CERTIFIED REGISTERED

## 2023-04-28 PROCEDURE — 45380 COLONOSCOPY AND BIOPSY: CPT | Performed by: INTERNAL MEDICINE

## 2023-04-28 RX ORDER — SODIUM CHLORIDE 0.9 % (FLUSH) 0.9 %
10 SYRINGE (ML) INJECTION AS NEEDED
Status: DISCONTINUED | OUTPATIENT
Start: 2023-04-28 | End: 2023-04-28 | Stop reason: HOSPADM

## 2023-04-28 RX ORDER — SODIUM CHLORIDE, SODIUM LACTATE, POTASSIUM CHLORIDE, CALCIUM CHLORIDE 600; 310; 30; 20 MG/100ML; MG/100ML; MG/100ML; MG/100ML
30 INJECTION, SOLUTION INTRAVENOUS CONTINUOUS PRN
Status: DISCONTINUED | OUTPATIENT
Start: 2023-04-28 | End: 2023-04-28 | Stop reason: HOSPADM

## 2023-04-28 RX ORDER — SODIUM CHLORIDE 0.9 % (FLUSH) 0.9 %
10 SYRINGE (ML) INJECTION EVERY 12 HOURS SCHEDULED
Status: DISCONTINUED | OUTPATIENT
Start: 2023-04-28 | End: 2023-04-28 | Stop reason: HOSPADM

## 2023-04-28 RX ORDER — LIDOCAINE HYDROCHLORIDE 20 MG/ML
INJECTION, SOLUTION INFILTRATION; PERINEURAL AS NEEDED
Status: DISCONTINUED | OUTPATIENT
Start: 2023-04-28 | End: 2023-04-28 | Stop reason: SURG

## 2023-04-28 RX ORDER — PROPOFOL 10 MG/ML
VIAL (ML) INTRAVENOUS CONTINUOUS PRN
Status: DISCONTINUED | OUTPATIENT
Start: 2023-04-28 | End: 2023-04-28 | Stop reason: SURG

## 2023-04-28 RX ORDER — SODIUM CHLORIDE 9 MG/ML
40 INJECTION, SOLUTION INTRAVENOUS AS NEEDED
Status: DISCONTINUED | OUTPATIENT
Start: 2023-04-28 | End: 2023-04-28 | Stop reason: HOSPADM

## 2023-04-28 RX ADMIN — LIDOCAINE HYDROCHLORIDE 50 MG: 20 INJECTION, SOLUTION INFILTRATION; PERINEURAL at 14:25

## 2023-04-28 RX ADMIN — PROPOFOL 250 MCG/KG/MIN: 10 INJECTION, EMULSION INTRAVENOUS at 14:25

## 2023-04-28 RX ADMIN — SODIUM CHLORIDE, POTASSIUM CHLORIDE, SODIUM LACTATE AND CALCIUM CHLORIDE 30 ML/HR: 600; 310; 30; 20 INJECTION, SOLUTION INTRAVENOUS at 13:55

## 2023-04-28 NOTE — DISCHARGE INSTRUCTIONS
For the next 24 hours patient needs to be with a responsible adult.    For 24 hours DO NOT drive, operate machinery, appliances, drink alcohol, make important decisions or sign legal documents.    Start with a light or bland diet if you are feeling sick to your stomach otherwise advance to regular diet as tolerated.    Follow recommendations on procedure report if provided by your doctor.    Call Dr. Mccoy for problems [phone].    Problems may include but not limited to: large amounts of bleeding, trouble breathing, repeated vomiting, severe unrelieved pain, fever or chills.

## 2023-04-28 NOTE — ANESTHESIA PREPROCEDURE EVALUATION
Anesthesia Evaluation     Patient summary reviewed and Nursing notes reviewed   NPO Solid Status: Waived due to emergency  NPO Liquid Status: Waived due to emergency           Airway   Mallampati: II  TM distance: >3 FB  Neck ROM: full  Possible difficult intubation and Narrow palate  Dental - normal exam     Pulmonary - negative pulmonary ROS and normal exam   Cardiovascular - normal exam  Exercise tolerance: good (4-7 METS)    (+) hyperlipidemia,       Neuro/Psych- negative ROS  GI/Hepatic/Renal/Endo    (+)  GERD,  thyroid problem hypothyroidism    Musculoskeletal (-) negative ROS    Abdominal    Substance History - negative use     OB/GYN          Other      history of cancer                      Anesthesia Plan    ASA 3 - emergent     MAC     intravenous induction     Anesthetic plan, risks, benefits, and alternatives have been provided, discussed and informed consent has been obtained with: patient.    Plan discussed with CRNA.        CODE STATUS:

## 2023-04-28 NOTE — H&P
Chief Complaint   Patient presents with   • EGD         History of Present Illness:   62 y.o. female        In 8 of 2022 she got a piece of meat stuck in her esophagus which were required me to do an EGD with removal of the esophageal foreign body.  I then dilated the distal esophagus.       She does have a history of colon polyps.  Dr. Zechariah Saldana did a colonoscopy about 5 years ago on her.  She is due for another colonoscopy now.       NO dysphagia. Protonix 40 mg/day works better than the Pepcid. She wants to get off of Pepcid and retry Protonix 40 mg/day.      Medical History        Past Medical History:   Diagnosis Date   • Abel esophagus 2019   • Colon polyp     • Disease of thyroid gland     • Endometrial adenocarcinoma (HCC)     • Endometrial adenocarcinoma (HCC) 10/26/2019   • Fibrocystic breast changes, bilateral 03/01/2022   • GERD (gastroesophageal reflux disease)     • Hyperlipidemia     • Hypothyroidism              Surgical History         Past Surgical History:   Procedure Laterality Date   • BREAST BIOPSY Right 2022     Benign   • COLONOSCOPY N/A 11/20/2017     Procedure: COLONOSCOPY TO CECUM  WITH COLD BIOPSY POLYPECTOMY;  Surgeon: Nidia Saldana MD;  Location[facility] ENDOSCOPY;  Service:    • COLONOSCOPY W/ POLYPECTOMY N/A 07/10/2012     Hyperplastic sigmoid poylp-Dr. Nidia Saldana   • D & C HYSTEROSCOPY N/A 11/13/2019     Dr. Jose Perkins   • ENDOSCOPY N/A 08/15/2022     Procedure: ESOPHAGOGASTRODUODENOSCOPY WITH REMOVAL OF FOREIGN BODY/FOOD BOLUS;  Surgeon: Primitivo Mccoy MD;  Location: [facility] OR;  Service: Gastroenterology;  Laterality: N/A;   • HYSTERECTOMY   11/20/2019   • UPPER GASTROINTESTINAL ENDOSCOPY   2022   • WISDOM TOOTH EXTRACTION                   Current Outpatient Medications:   •  levothyroxine (SYNTHROID, LEVOTHROID) 112 MCG tablet, Take 1 tablet by mouth Daily., Disp: 90 tablet, Rfl: 3  •  rosuvastatin (CRESTOR) 10 MG tablet, Take 1 tablet by mouth Daily., Disp: 90  tablet, Rfl: 1  •  pantoprazole (PROTONIX) 40 MG EC tablet, Take 1 tablet by mouth Daily., Disp: 90 tablet, Rfl: 3     No Known Allergies           Family History   Problem Relation Age of Onset   • Hyperlipidemia Mother     • Alzheimer's disease Father     • Prostate cancer Father     • Cancer Daughter           Sarcoma   • Breast cancer Neg Hx           Social History   Social History      Socioeconomic History   • Marital status:        Spouse name: Oscar   • Number of children: 2   • Years of education: College   Tobacco Use   • Smoking status: Never   • Smokeless tobacco: Never   Vaping Use   • Vaping Use: Never used   Substance and Sexual Activity   • Alcohol use: No       Comment: Socially   • Drug use: No   • Sexual activity: Not Currently       Partners: Male       Birth control/protection: Hysterectomy            Review of Systems   Gastrointestinal: Negative for abdominal pain.   All other systems reviewed and are negative.     Pertinent positives and negatives documented in the HPI and all other systems reviewed and were found to be negative.      Vitals:     01/18/23 1555   BP: 131/85   Pulse: 68   Temp: 97.5 °F (36.4 °C)   SpO2: 98%         Physical Exam  Vitals reviewed.   Constitutional:       General: She is not in acute distress.     Appearance: Normal appearance. She is well-developed. She is obese. She is not diaphoretic.   HENT:      Head: Normocephalic and atraumatic. Hair is normal.      Right Ear: Hearing, tympanic membrane, ear canal and external ear normal. No decreased hearing noted. No drainage.      Left Ear: Hearing, tympanic membrane, ear canal and external ear normal. No decreased hearing noted.      Nose: Nose normal. No nasal deformity.      Mouth/Throat:      Mouth: Mucous membranes are moist.   Eyes:      General: Lids are normal.         Right eye: No discharge.         Left eye: No discharge.      Extraocular Movements: Extraocular movements intact.       Conjunctiva/sclera: Conjunctivae normal.      Pupils: Pupils are equal, round, and reactive to light.   Neck:      Thyroid: No thyromegaly.      Vascular: No JVD.      Trachea: No tracheal deviation.   Cardiovascular:      Rate and Rhythm: Normal rate and regular rhythm.      Pulses: Normal pulses.      Heart sounds: Normal heart sounds. No murmur heard.    No friction rub. No gallop.   Pulmonary:      Effort: Pulmonary effort is normal. No respiratory distress.      Breath sounds: Normal breath sounds. No wheezing or rales.   Chest:      Chest wall: No tenderness.   Abdominal:      General: Bowel sounds are normal. There is no distension.      Palpations: Abdomen is soft. There is no mass.      Tenderness: There is no abdominal tenderness. There is no guarding or rebound.      Hernia: No hernia is present.   Musculoskeletal:         General: No tenderness or deformity. Normal range of motion.      Cervical back: Normal range of motion and neck supple.   Lymphadenopathy:      Cervical: No cervical adenopathy.   Skin:     General: Skin is warm and dry.      Findings: No erythema or rash.   Neurological:      Mental Status: She is alert and oriented to person, place, and time.      Cranial Nerves: No cranial nerve deficit.      Motor: No abnormal muscle tone.      Coordination: Coordination normal.      Deep Tendon Reflexes: Reflexes are normal and symmetric. Reflexes normal.   Psychiatric:         Mood and Affect: Mood normal.         Behavior: Behavior normal.         Thought Content: Thought content normal.         Judgment: Judgment normal.            Diagnoses and all orders for this visit:     1. History of colon polyps (Primary)  -     Case Request; Standing  -     Case Request     2. Gastroesophageal reflux disease, unspecified whether esophagitis present  -     pantoprazole (PROTONIX) 40 MG EC tablet; Take 1 tablet by mouth Daily.  Dispense: 90 tablet; Refill: 3     Other orders  -     Follow Anesthesia  Guidelines / Protocol; Future  -     Obtain Informed Consent; Future  -     Implement Anesthesia orders day of procedure.; Standing  -     Obtain informed consent; Standing  -     Verify bowel prep was successful; Standing  -     Give tap water enema if bowel prep was insufficient; Standing        Assessment:  1.  History of colon polyps.  Her last colonoscopy was about 5 years ago when Dr. Zechariah Saldana did a colonoscopy.  2.  Esophageal reflux disease with a history of an esophageal foreign body requiring the esophageal foreign body to be removed in the emergency room in 8 of 2022.  3.     Recommendations:  1. Protonix 40 mg/day.   2. Colonoscopy.  3. We discussed pros and cons of chronic PPI use.  4. Lose weight.   5.      No follow-ups on file.     4/28/23 - No change from the above H and P.    Primitivo Mccoy MD

## 2023-04-28 NOTE — ANESTHESIA POSTPROCEDURE EVALUATION
"Patient: Bhavna Elliott    Procedure Summary     Date: 04/28/23 Room / Location: [facility] ENDOSCOPY 6 / [facility] ENDOSCOPY    Anesthesia Start: 1422 Anesthesia Stop: 1447    Procedure: COLONOSCOPY to cecum into terminal ileum with bx polypectomy Diagnosis:       History of colon polyps      (History of colon polyps [Z86.010])    Surgeons: Primitivo Mccoy MD Provider: Jemal Weber MD    Anesthesia Type: MAC ASA Status: 3 - Emergent          Anesthesia Type: MAC    Vitals  Vitals Value Taken Time   /58 04/28/23 1456   Temp     Pulse 68 04/28/23 1506   Resp 14 04/28/23 1506   SpO2 99 % 04/28/23 1506           Post Anesthesia Care and Evaluation    Patient location during evaluation: bedside  Patient participation: complete - patient participated  Level of consciousness: awake and alert  Pain management: adequate    Airway patency: patent  Anesthetic complications: No anesthetic complications    Cardiovascular status: acceptable  Respiratory status: acceptable  Hydration status: acceptable    Comments: /58 (BP Location: Right arm, Patient Position: Lying)   Pulse 68   Resp 14   Ht 162.6 cm (64\")   Wt 89.2 kg (196 lb 9.6 oz)   SpO2 99%   BMI 33.75 kg/m²       "

## 2023-05-02 LAB
LAB AP CASE REPORT: NORMAL
PATH REPORT.FINAL DX SPEC: NORMAL
PATH REPORT.GROSS SPEC: NORMAL

## 2023-05-04 NOTE — PROGRESS NOTES
05/03/23       Tell her that the colon polyp that was removed was not cancerous but was precancerous. I recommend that we do a repeat colonoscopy in one year to make sure that all of that polyp was completely removed?       Send a copy of this report to her PCP.   Jhon caputo

## 2023-05-16 ENCOUNTER — HOSPITAL ENCOUNTER (OUTPATIENT)
Dept: BONE DENSITY | Facility: HOSPITAL | Age: 63
Discharge: HOME | End: 2023-05-16
Admitting: SPECIALIST
Payer: COMMERCIAL

## 2023-05-16 DIAGNOSIS — Z78.0 MENOPAUSE: ICD-10-CM

## 2023-05-16 PROCEDURE — 77080 DXA BONE DENSITY AXIAL: CPT

## 2023-06-01 ENCOUNTER — TELEPHONE (OUTPATIENT)
Dept: GASTROENTEROLOGY | Facility: CLINIC | Age: 63
End: 2023-06-01

## 2023-06-01 NOTE — TELEPHONE ENCOUNTER
----- Message from Primitivo Mccoy MD sent at 5/3/2023 10:03 PM EDT -----  05/03/23       Tell her that the colon polyp that was removed was not cancerous but was precancerous. I recommend that we do a repeat colonoscopy in one year to make sure that all of that polyp was completely removed?       Send a copy of this report to her PCP.   Thx. kjh

## 2023-06-01 NOTE — TELEPHONE ENCOUNTER
Called pt and advised of Dr Mccoy's note. Verb understanding.     C/s placed in recall and hm for 04/28/2024.    Results sent to Dr Darden thru [facility].

## 2023-09-06 ENCOUNTER — TRANSCRIBE ORDERS (OUTPATIENT)
Dept: ADMINISTRATIVE | Facility: HOSPITAL | Age: 63
End: 2023-09-06
Payer: COMMERCIAL

## 2023-09-06 DIAGNOSIS — Z12.31 ENCOUNTER FOR SCREENING MAMMOGRAM FOR MALIGNANT NEOPLASM OF BREAST: Primary | ICD-10-CM

## 2023-10-09 ENCOUNTER — HOSPITAL ENCOUNTER (OUTPATIENT)
Dept: MAMMOGRAPHY | Facility: HOSPITAL | Age: 63
Discharge: HOME | End: 2023-10-09
Admitting: FAMILY MEDICINE
Payer: COMMERCIAL

## 2023-10-09 DIAGNOSIS — Z12.31 ENCOUNTER FOR SCREENING MAMMOGRAM FOR MALIGNANT NEOPLASM OF BREAST: ICD-10-CM

## 2023-10-09 PROCEDURE — 77067 SCR MAMMO BI INCL CAD: CPT

## 2023-10-09 PROCEDURE — 77063 BREAST TOMOSYNTHESIS BI: CPT

## 2023-10-10 DIAGNOSIS — R92.8 ABNORMAL MAMMOGRAM: Primary | ICD-10-CM

## 2023-10-27 ENCOUNTER — OFFICE VISIT (OUTPATIENT)
Dept: INTERNAL MEDICINE | Facility: CLINIC | Age: 63
End: 2023-10-27
Payer: COMMERCIAL

## 2023-10-27 VITALS
DIASTOLIC BLOOD PRESSURE: 80 MMHG | HEIGHT: 64 IN | TEMPERATURE: 98 F | OXYGEN SATURATION: 98 % | WEIGHT: 205 LBS | HEART RATE: 94 BPM | BODY MASS INDEX: 35 KG/M2 | SYSTOLIC BLOOD PRESSURE: 130 MMHG

## 2023-10-27 DIAGNOSIS — E03.9 ACQUIRED HYPOTHYROIDISM: Primary | ICD-10-CM

## 2023-10-27 DIAGNOSIS — R73.02 IMPAIRED GLUCOSE TOLERANCE: ICD-10-CM

## 2023-10-27 PROCEDURE — 99213 OFFICE O/P EST LOW 20 MIN: CPT | Performed by: FAMILY MEDICINE

## 2023-10-27 NOTE — PROGRESS NOTES
Leo Elliott is a 62 y.o. female.   Chief Complaint   Patient presents with    Hypothyroidism    Hyperglycemia       History of Present Illness     1. impaired fasting glucose- she decreased snacks, she makes healthier choices.  Less red meat.  She does go for second servings.  She walks 2-3 times a week for 50 minutes.  She gained 3 pounds from last office visit.  No family history of diabetes.  , A1c at 6.3 from 6.2 from 6.3 from 6.5 from 6.2 from 6.1.  BMI 35.2.     2. hypothyroidism-patient has no history of thyroid surgery.  She is on levothyroxine at 112 µg a day.  She takes 1 tablet 6 days a week, half tablet on Sundays.  We decreased dose at last office visit.  She takes it everyday on empty stomach and does not eat for 60 minutes after taking it.  TSH is at 1.25.    Review of Systems   Respiratory:  Negative for shortness of breath.    Cardiovascular:  Negative for chest pain and palpitations.   Neurological:  Negative for light-headedness.         Objective   Wt Readings from Last 3 Encounters:   10/27/23 93 kg (205 lb)   07/21/23 91.6 kg (202 lb)   04/28/23 89.2 kg (196 lb 9.6 oz)      Vitals:    10/27/23 1133   BP: 130/80   Pulse: 94   Temp: 98 °F (36.7 °C)   SpO2: 98%     Temp Readings from Last 3 Encounters:   10/27/23 98 °F (36.7 °C)   07/21/23 97.6 °F (36.4 °C)     BP Readings from Last 3 Encounters:   10/27/23 130/80   07/21/23 122/80   04/28/23 114/58     Pulse Readings from Last 3 Encounters:   10/27/23 94   07/21/23 59   04/28/23 68     Body mass index is 35.17 kg/m².    Physical Exam  Constitutional:       Appearance: She is well-developed.   Neck:      Thyroid: No thyromegaly.      Vascular: No carotid bruit.   Cardiovascular:      Rate and Rhythm: Normal rate and regular rhythm.      Heart sounds: Normal heart sounds.   Pulmonary:      Effort: Pulmonary effort is normal.      Breath sounds: Normal breath sounds.   Skin:     General: Skin is warm and dry.    Neurological:      Mental Status: She is alert.         Assessment & Plan   Diagnoses and all orders for this visit:    1. Acquired hypothyroidism (Primary)    2. Impaired glucose tolerance  -     Hemoglobin A1c; Future  -     Basic Metabolic Panel; Future        Impaired fasting glucose-worsening.  Information on counting carbs provided.  She is advised to decrease portion size by 25%.  Increase exercise to 5 days a week.  Follow-up in 3 months.  Labs before office visit.    Hypothyroidism-continue current treatment.  Follow-up in 12 months.

## 2023-11-10 ENCOUNTER — HOSPITAL ENCOUNTER (OUTPATIENT)
Dept: ULTRASOUND IMAGING | Facility: HOSPITAL | Age: 63
Discharge: HOME | End: 2023-11-10
Payer: COMMERCIAL

## 2023-11-10 ENCOUNTER — HOSPITAL ENCOUNTER (OUTPATIENT)
Dept: MAMMOGRAPHY | Facility: HOSPITAL | Age: 63
Discharge: HOME | End: 2023-11-10
Admitting: FAMILY MEDICINE
Payer: COMMERCIAL

## 2023-11-10 DIAGNOSIS — R92.8 ABNORMAL MAMMOGRAM: ICD-10-CM

## 2023-11-10 PROCEDURE — G0279 TOMOSYNTHESIS, MAMMO: HCPCS

## 2023-11-10 PROCEDURE — 77065 DX MAMMO INCL CAD UNI: CPT

## 2023-11-10 PROCEDURE — 76642 ULTRASOUND BREAST LIMITED: CPT

## 2024-01-18 DIAGNOSIS — R73.02 IMPAIRED GLUCOSE TOLERANCE: ICD-10-CM

## 2024-01-20 LAB
BUN SERPL-MCNC: 14 MG/DL (ref 8–27)
BUN/CREAT SERPL: 17 (ref 12–28)
CALCIUM SERPL-MCNC: 9.7 MG/DL (ref 8.7–10.3)
CHLORIDE SERPL-SCNC: 104 MMOL/L (ref 96–106)
CO2 SERPL-SCNC: 23 MMOL/L (ref 20–29)
CREAT SERPL-MCNC: 0.81 MG/DL (ref 0.57–1)
EGFRCR SERPLBLD CKD-EPI 2021: 81 ML/MIN/1.73
GLUCOSE SERPL-MCNC: 104 MG/DL (ref 70–99)
HBA1C MFR BLD: 6.3 % (ref 4.8–5.6)
POTASSIUM SERPL-SCNC: 4.3 MMOL/L (ref 3.5–5.2)
SODIUM SERPL-SCNC: 143 MMOL/L (ref 134–144)

## 2024-01-26 ENCOUNTER — OFFICE VISIT (OUTPATIENT)
Dept: INTERNAL MEDICINE | Facility: CLINIC | Age: 64
End: 2024-01-26
Payer: COMMERCIAL

## 2024-01-26 VITALS
TEMPERATURE: 98.4 F | BODY MASS INDEX: 35.61 KG/M2 | HEART RATE: 62 BPM | OXYGEN SATURATION: 98 % | SYSTOLIC BLOOD PRESSURE: 118 MMHG | HEIGHT: 64 IN | DIASTOLIC BLOOD PRESSURE: 76 MMHG | WEIGHT: 208.6 LBS

## 2024-01-26 DIAGNOSIS — R73.02 IMPAIRED GLUCOSE TOLERANCE: Primary | ICD-10-CM

## 2024-01-26 DIAGNOSIS — E78.5 HYPERLIPIDEMIA, UNSPECIFIED HYPERLIPIDEMIA TYPE: ICD-10-CM

## 2024-01-26 PROCEDURE — 99213 OFFICE O/P EST LOW 20 MIN: CPT | Performed by: FAMILY MEDICINE

## 2024-01-26 RX ORDER — ROSUVASTATIN CALCIUM 10 MG/1
10 TABLET, COATED ORAL DAILY
Qty: 90 TABLET | Refills: 1 | Status: SHIPPED | OUTPATIENT
Start: 2024-01-26

## 2024-01-26 NOTE — PROGRESS NOTES
Subjective   Bhavna Elliott is a 63 y.o. female.   Chief Complaint   Patient presents with    Impaired Fasting Glucose    Hyperlipidemia       Hyperlipidemia  Pertinent negatives include no chest pain, myalgias or shortness of breath.        Hyperlipidemia-patient is on rosuvastatin at 10 mg a day.  She takes it every day.  She has no side effects.      Impaired fasting glucose- from 123.  A1c 6.3 from 6.3.  She walks regularly 3-4 times a week for an hour.  With her diet is a little worse lately.  She eats late and frequently goes for second servings.    Review of Systems   Respiratory:  Negative for shortness of breath.    Cardiovascular:  Negative for chest pain and palpitations.   Musculoskeletal:  Negative for myalgias.         Objective   Wt Readings from Last 3 Encounters:   01/26/24 94.6 kg (208 lb 9.6 oz)   10/27/23 93 kg (205 lb)   07/21/23 91.6 kg (202 lb)      Vitals:    01/26/24 1058   BP: 118/76   Pulse: 62   Temp: 98.4 °F (36.9 °C)   SpO2: 98%     Temp Readings from Last 3 Encounters:   01/26/24 98.4 °F (36.9 °C)   10/27/23 98 °F (36.7 °C)   07/21/23 97.6 °F (36.4 °C)     BP Readings from Last 3 Encounters:   01/26/24 118/76   10/27/23 130/80   07/21/23 122/80     Pulse Readings from Last 3 Encounters:   01/26/24 62   10/27/23 94   07/21/23 59     Body mass index is 35.79 kg/m².    Physical Exam  Constitutional:       Appearance: She is well-developed.   Neck:      Vascular: No carotid bruit.   Cardiovascular:      Rate and Rhythm: Normal rate and regular rhythm.      Heart sounds: Normal heart sounds.   Pulmonary:      Effort: Pulmonary effort is normal.      Breath sounds: Normal breath sounds.   Skin:     General: Skin is warm and dry.   Neurological:      Mental Status: She is alert.         Assessment & Plan   Diagnoses and all orders for this visit:    1. Impaired glucose tolerance (Primary)  -     Hemoglobin A1c; Future  -     Comprehensive Metabolic Panel; Future    2.  Hyperlipidemia, unspecified hyperlipidemia type  -     Lipid Panel With LDL / HDL Ratio; Future  -     Comprehensive Metabolic Panel; Future    Other orders  -     rosuvastatin (CRESTOR) 10 MG tablet; Take 1 tablet by mouth Daily.  Dispense: 90 tablet; Refill: 1        Hyperlipidemia-continue current treatment.  Follow-up in 3 months.    Impaired fasting glucose-worsening of diet.  She is advised to stop going for second servings.  Do not eat late.  Start tracing food intakes.  Follow-up in 3 months.  Labs before office visit.

## 2024-02-06 DIAGNOSIS — K21.9 GASTROESOPHAGEAL REFLUX DISEASE, UNSPECIFIED WHETHER ESOPHAGITIS PRESENT: ICD-10-CM

## 2024-02-06 RX ORDER — PANTOPRAZOLE SODIUM 40 MG/1
40 TABLET, DELAYED RELEASE ORAL DAILY
Qty: 90 TABLET | Refills: 0 | Status: SHIPPED | OUTPATIENT
Start: 2024-02-06

## 2024-02-22 ENCOUNTER — TELEPHONE (OUTPATIENT)
Dept: GASTROENTEROLOGY | Facility: CLINIC | Age: 64
End: 2024-02-22
Payer: COMMERCIAL

## 2024-02-22 NOTE — TELEPHONE ENCOUNTER
LAST C/S 4/28/23   IN [facility]      PERSONAL HX OF POLYPS    NO FAMILY HX OF POLYPS    NO FAMILY HX OF COLON CA    NO ASA OR BLOOD THINNERS        LIST OF  MEDICATIONS    ROSUVASTATIN   LEVOTHYROXINE  PANTOPRAZOLE            OA QUESTIONNAIRE SCANNED IN MEDIA

## 2024-02-28 ENCOUNTER — PREP FOR SURGERY (OUTPATIENT)
Dept: OTHER | Facility: HOSPITAL | Age: 64
End: 2024-02-28
Payer: COMMERCIAL

## 2024-02-28 DIAGNOSIS — Z86.010 HISTORY OF COLON POLYPS: Primary | ICD-10-CM

## 2024-04-01 ENCOUNTER — TELEPHONE (OUTPATIENT)
Dept: GASTROENTEROLOGY | Facility: CLINIC | Age: 64
End: 2024-04-01
Payer: COMMERCIAL

## 2024-04-01 NOTE — TELEPHONE ENCOUNTER
Hub staff attempted to follow warm transfer process and was unsuccessful     Caller: Bhavna Elliott    Relationship to patient: Self    Best call back number: [phone]    Patient is needing: RECALL- PER DR. ATKINS, PATIENT IS NEEDING TO BE SCHEDULED FOR HER COLONOSCOPY AND A MESSAGE WAS PUT IN ON 02/22/24 AND SHE HASN'T HEARD FROM THE OFFICE.

## 2024-04-01 NOTE — TELEPHONE ENCOUNTER
Please call and schedule       Hub staff attempted to follow warm transfer process and was unsuccessful     Caller: Bhavna Elliott    Relationship to patient: Self    Best call back number: [phone]    Patient is needing: RECALL- PER DR. ATKINS, PATIENT IS NEEDING TO BE SCHEDULED FOR HER COLONOSCOPY AND A MESSAGE WAS PUT IN ON 02/22/24 AND SHE HASN'T HEARD FROM THE OFFICE.

## 2024-04-04 ENCOUNTER — TELEPHONE (OUTPATIENT)
Dept: GASTROENTEROLOGY | Facility: CLINIC | Age: 64
End: 2024-04-04
Payer: COMMERCIAL

## 2024-04-04 NOTE — TELEPHONE ENCOUNTER
NAVARRO DÍAZ IN SCHEDULING PT SCHEDULED 08/22/2024 ARRIVING AT 1:00PM.[facility] PREP INFORMATION MAILED TO ADDRESS ON FILE VERIFIED BY THE PT.OK FOR HUB TO RELAY

## 2024-04-10 ENCOUNTER — TELEPHONE (OUTPATIENT)
Dept: GASTROENTEROLOGY | Facility: CLINIC | Age: 64
End: 2024-04-10
Payer: COMMERCIAL

## 2024-04-10 NOTE — TELEPHONE ENCOUNTER
NAVARRO MERINO IN SCHEDULING PT R/S TO 04/16/2024 ARRIVING AT 0845AM PT VERBALIZES STILL HAS PREP INFORMATION PACKET AND WILL UPDATE TO REFLECT DATE AND TIME.OK FOR HUB TO RELAY

## 2024-04-16 ENCOUNTER — HOSPITAL ENCOUNTER (OUTPATIENT)
Facility: HOSPITAL | Age: 64
Setting detail: HOSPITAL OUTPATIENT SURGERY
Discharge: HOME | End: 2024-04-16
Attending: INTERNAL MEDICINE | Admitting: INTERNAL MEDICINE
Payer: COMMERCIAL

## 2024-04-16 ENCOUNTER — ANESTHESIA EVENT (OUTPATIENT)
Dept: GASTROENTEROLOGY | Facility: HOSPITAL | Age: 64
End: 2024-04-16
Payer: COMMERCIAL

## 2024-04-16 ENCOUNTER — ANESTHESIA (OUTPATIENT)
Dept: GASTROENTEROLOGY | Facility: HOSPITAL | Age: 64
End: 2024-04-16
Payer: COMMERCIAL

## 2024-04-16 VITALS
HEIGHT: 64 IN | SYSTOLIC BLOOD PRESSURE: 123 MMHG | HEART RATE: 68 BPM | RESPIRATION RATE: 16 BRPM | WEIGHT: 206.3 LBS | DIASTOLIC BLOOD PRESSURE: 71 MMHG | BODY MASS INDEX: 35.22 KG/M2 | OXYGEN SATURATION: 93 %

## 2024-04-16 DIAGNOSIS — Z86.010 HISTORY OF COLON POLYPS: ICD-10-CM

## 2024-04-16 PROCEDURE — 88305 TISSUE EXAM BY PATHOLOGIST: CPT | Performed by: INTERNAL MEDICINE

## 2024-04-16 PROCEDURE — 25010000002 GLYCOPYRROLATE 0.2 MG/ML SOLUTION: Performed by: NURSE ANESTHETIST, CERTIFIED REGISTERED

## 2024-04-16 PROCEDURE — 25810000003 LACTATED RINGERS PER 1000 ML: Performed by: INTERNAL MEDICINE

## 2024-04-16 PROCEDURE — 25010000002 PROPOFOL 10 MG/ML EMULSION: Performed by: NURSE ANESTHETIST, CERTIFIED REGISTERED

## 2024-04-16 PROCEDURE — 45380 COLONOSCOPY AND BIOPSY: CPT | Performed by: INTERNAL MEDICINE

## 2024-04-16 RX ORDER — PROPOFOL 10 MG/ML
VIAL (ML) INTRAVENOUS AS NEEDED
Status: DISCONTINUED | OUTPATIENT
Start: 2024-04-16 | End: 2024-04-16 | Stop reason: SURG

## 2024-04-16 RX ORDER — LIDOCAINE HYDROCHLORIDE 10 MG/ML
0.5 INJECTION, SOLUTION INFILTRATION; PERINEURAL ONCE AS NEEDED
Status: DISCONTINUED | OUTPATIENT
Start: 2024-04-16 | End: 2024-04-16 | Stop reason: HOSPADM

## 2024-04-16 RX ORDER — SODIUM CHLORIDE, SODIUM LACTATE, POTASSIUM CHLORIDE, CALCIUM CHLORIDE 600; 310; 30; 20 MG/100ML; MG/100ML; MG/100ML; MG/100ML
30 INJECTION, SOLUTION INTRAVENOUS CONTINUOUS PRN
Status: DISCONTINUED | OUTPATIENT
Start: 2024-04-16 | End: 2024-04-16 | Stop reason: HOSPADM

## 2024-04-16 RX ORDER — SODIUM CHLORIDE 0.9 % (FLUSH) 0.9 %
10 SYRINGE (ML) INJECTION AS NEEDED
Status: DISCONTINUED | OUTPATIENT
Start: 2024-04-16 | End: 2024-04-16 | Stop reason: HOSPADM

## 2024-04-16 RX ORDER — SODIUM CHLORIDE 9 MG/ML
1000 INJECTION, SOLUTION INTRAVENOUS CONTINUOUS
Status: DISCONTINUED | OUTPATIENT
Start: 2024-04-16 | End: 2024-04-16

## 2024-04-16 RX ORDER — GLYCOPYRROLATE 0.2 MG/ML
INJECTION INTRAMUSCULAR; INTRAVENOUS AS NEEDED
Status: DISCONTINUED | OUTPATIENT
Start: 2024-04-16 | End: 2024-04-16 | Stop reason: SURG

## 2024-04-16 RX ORDER — LIDOCAINE HYDROCHLORIDE 20 MG/ML
INJECTION, SOLUTION INFILTRATION; PERINEURAL AS NEEDED
Status: DISCONTINUED | OUTPATIENT
Start: 2024-04-16 | End: 2024-04-16 | Stop reason: SURG

## 2024-04-16 RX ADMIN — PROPOFOL 160 MCG/KG/MIN: 10 INJECTION, EMULSION INTRAVENOUS at 09:34

## 2024-04-16 RX ADMIN — SODIUM CHLORIDE, POTASSIUM CHLORIDE, SODIUM LACTATE AND CALCIUM CHLORIDE 30 ML/HR: 600; 310; 30; 20 INJECTION, SOLUTION INTRAVENOUS at 09:26

## 2024-04-16 RX ADMIN — PROPOFOL 20 MG: 10 INJECTION, EMULSION INTRAVENOUS at 09:39

## 2024-04-16 RX ADMIN — LIDOCAINE HYDROCHLORIDE 50 MG: 20 INJECTION, SOLUTION INFILTRATION; PERINEURAL at 09:34

## 2024-04-16 RX ADMIN — GLYCOPYRROLATE 0.2 MG: 0.2 INJECTION INTRAMUSCULAR; INTRAVENOUS at 09:34

## 2024-04-16 RX ADMIN — PROPOFOL 50 MG: 10 INJECTION, EMULSION INTRAVENOUS at 09:34

## 2024-04-16 NOTE — H&P
[facility]  Pre Procedure History & Physical    Chief Complaint:   Time for my colonoscopy    Subjective     HPI:   63 y.o. female who has a history of colon polyps.  Her last colonoscopy was in 4 of 2023 when a 7 mm ascending colon polyp was removed and pathology said it was a sessile serrated lesion.  Today's colonoscopy is to make sure that that polyp was completely removed?    Past Medical History:   Past Medical History:   Diagnosis Date    Abel esophagus 2019    Colon polyp     Disease of thyroid gland     Endometrial adenocarcinoma     Endometrial adenocarcinoma 10/26/2019    Fibrocystic breast changes, bilateral 03/01/2022    GERD (gastroesophageal reflux disease)     Hyperlipidemia     Hypothyroidism     Slow to wake up after anesthesia        Family History:  Family History   Problem Relation Age of Onset    Hyperlipidemia Mother     Alzheimer's disease Father     Prostate cancer Father     Cancer Daughter         Sarcoma    Breast cancer Neg Hx     Malig Hyperthermia Neg Hx        Social History:   reports that she has never smoked. She has never used smokeless tobacco. She reports that she does not drink alcohol and does not use drugs.    Medications:   Medications Prior to Admission   Medication Sig Dispense Refill Last Dose    levothyroxine (SYNTHROID, LEVOTHROID) 112 MCG tablet Take 1 tab daily except of Sunday when you take half tablet. 90 tablet 1     pantoprazole (PROTONIX) 40 MG EC tablet TAKE 1 TABLET BY MOUTH EVERY DAY 90 tablet 0     rosuvastatin (CRESTOR) 10 MG tablet Take 1 tablet by mouth Daily. 90 tablet 1        Allergies:  Patient has no known allergies.    ROS:    Pertinent items are noted in HPI     Objective     There were no vitals taken for this visit.    Physical Exam   Constitutional: Pt is oriented to person, place, and time and well-developed, well-nourished, and in no distress.   HENT:   Mouth/Throat: Oropharynx is clear and moist.   Neck: Normal  range of motion. Neck supple.   Cardiovascular: Normal rate, regular rhythm and normal heart sounds.    Pulmonary/Chest: Effort normal and breath sounds normal. No respiratory distress. No  wheezes.   Abdominal: Soft. Bowel sounds are normal.   Skin: Skin is warm and dry.   Psychiatric: Mood, memory, affect and judgment normal.     Assessment & Plan     Diagnosis:  63 y.o. female who has a history of colon polyps.  Her last colonoscopy was in 4 of 2023 when a 7 mm ascending colon polyp was removed and pathology said it was a sessile serrated lesion.  Today's colonoscopy is to make sure that that polyp was completely removed?    Anticipated Surgical Procedure:  Colonoscopy    The risks, benefits, and alternatives of this procedure have been discussed with the patient or the responsible party- the patient understands and agrees to proceed.    Primitivo Mccoy M.D.

## 2024-04-16 NOTE — DISCHARGE INSTRUCTIONS
For the next 24 hours patient needs to be with a responsible adult.    For 24 hours DO NOT drive, operate machinery, appliances, drink alcohol, make important decisions or sign legal documents.    Start with a light or bland diet if you are feeling sick to your stomach otherwise advance to regular diet as tolerated.    Follow recommendations on procedure report if provided by your doctor.    Call Dr arguello for problems [phone]    Problems may include but not limited to: large amounts of bleeding, trouble breathing, repeated vomiting, severe unrelieved pain, fever or chills.

## 2024-04-16 NOTE — ANESTHESIA PREPROCEDURE EVALUATION
Anesthesia Evaluation     Patient summary reviewed and Nursing notes reviewed   no history of anesthetic complications:   NPO Solid Status: > 8 hours  NPO Liquid Status: > 2 hours           Airway   Mallampati: II  TM distance: >3 FB  Neck ROM: full  Dental      Pulmonary    Cardiovascular     (+) hyperlipidemia      Neuro/Psych  GI/Hepatic/Renal/Endo    (+) obesity, GERD, thyroid problem hypothyroidism    Musculoskeletal     Abdominal    Substance History      OB/GYN          Other                    Anesthesia Plan    ASA 2     MAC     intravenous induction     Anesthetic plan, risks, benefits, and alternatives have been provided, discussed and informed consent has been obtained with: patient.    CODE STATUS:

## 2024-04-16 NOTE — ANESTHESIA POSTPROCEDURE EVALUATION
Patient: Bhavna Elliott    Procedure Summary       Date: 04/16/24 Room / Location: [facility] ENDOSCOPY 6 /  [facility]    Anesthesia Start: 0929 Anesthesia Stop: 0954    Procedure: COLONOSCOPY to cecum with cold polypectomy Diagnosis:       History of colon polyps      (History of colon polyps [Z86.010])    Surgeons: Primitivo Mccoy MD Provider: Jose Gongora MD    Anesthesia Type: MAC ASA Status: 2            Anesthesia Type: MAC    Vitals  Vitals Value Taken Time   /82 04/16/24 1009   Temp     Pulse 68 04/16/24 1018   Resp 16 04/16/24 1009   SpO2 93 % 04/16/24 1018   Vitals shown include unfiled device data.        Post Anesthesia Care and Evaluation    Patient location during evaluation: bedside  Patient participation: complete - patient participated  Level of consciousness: awake and alert  Pain management: adequate    Airway patency: patent  Anesthetic complications: No anesthetic complications  PONV Status: controlled  Cardiovascular status: blood pressure returned to baseline and acceptable  Respiratory status: acceptable  Hydration status: acceptable

## 2024-04-17 LAB
LAB AP CASE REPORT: NORMAL
PATH REPORT.FINAL DX SPEC: NORMAL
PATH REPORT.GROSS SPEC: NORMAL

## 2024-04-19 NOTE — PROGRESS NOTES
04/19/24       Tell her that the colon polyp that was removed was not cancerous and not precancerous, which is good.  I would recommend that she have a repeat colonoscopy in 3 years.  Please send a copy of this report to her PCP.  Jhon caputo

## 2024-04-22 ENCOUNTER — TELEPHONE (OUTPATIENT)
Dept: GASTROENTEROLOGY | Facility: CLINIC | Age: 64
End: 2024-04-22
Payer: COMMERCIAL

## 2024-04-22 NOTE — TELEPHONE ENCOUNTER
Called pt dn advised of Dr Mccoy's note. Verb understanding.     C/s placed in recall and hm for 04/16/2027.    Results sent to pcp thru [facility].

## 2024-04-22 NOTE — TELEPHONE ENCOUNTER
----- Message from Primitivo Mccoy MD sent at 4/19/2024  7:33 AM EDT -----  04/19/24       Tell her that the colon polyp that was removed was not cancerous and not precancerous, which is good.  I would recommend that she have a repeat colonoscopy in 3 years.  Please send a copy of this report to her PCP.  Thx. kjh

## 2024-04-26 ENCOUNTER — OFFICE VISIT (OUTPATIENT)
Dept: INTERNAL MEDICINE | Facility: CLINIC | Age: 64
End: 2024-04-26
Payer: COMMERCIAL

## 2024-04-26 VITALS
BODY MASS INDEX: 35.51 KG/M2 | OXYGEN SATURATION: 97 % | HEART RATE: 75 BPM | TEMPERATURE: 97.5 F | WEIGHT: 208 LBS | DIASTOLIC BLOOD PRESSURE: 75 MMHG | SYSTOLIC BLOOD PRESSURE: 134 MMHG | HEIGHT: 64 IN

## 2024-04-26 DIAGNOSIS — R73.02 IMPAIRED GLUCOSE TOLERANCE: ICD-10-CM

## 2024-04-26 DIAGNOSIS — E03.9 ACQUIRED HYPOTHYROIDISM: ICD-10-CM

## 2024-04-26 DIAGNOSIS — E78.5 HYPERLIPIDEMIA, UNSPECIFIED HYPERLIPIDEMIA TYPE: Primary | ICD-10-CM

## 2024-04-26 PROCEDURE — 99213 OFFICE O/P EST LOW 20 MIN: CPT | Performed by: FAMILY MEDICINE

## 2024-04-26 NOTE — PROGRESS NOTES
Subjective   Bhavna Elliott is a 63 y.o. female.   Chief Complaint   Patient presents with    Hyperlipidemia    IFG       Hyperlipidemia  Pertinent negatives include no chest pain, myalgias or shortness of breath.        Hyperlipidemia-patient is on rosuvastatin at 10 mg a day.  She takes it every day.  She has no side effects.  No muscle aches, no muscle cramps.  , HDL 54.  AST 26 ALT 26,     Impaired fasting glucose - after last office visit she decreased eating after dinner.   from 104 from 123.  A1c 6.1 from 6.3 from 6.3.  She walks regularly 3-4 times a week for an hour.      Review of Systems   Respiratory:  Negative for shortness of breath.    Cardiovascular:  Negative for chest pain and palpitations.   Musculoskeletal:  Negative for myalgias.   Neurological:  Negative for light-headedness.         Objective   Wt Readings from Last 3 Encounters:   04/26/24 94.3 kg (208 lb)   04/16/24 93.6 kg (206 lb 4.8 oz)   01/26/24 94.6 kg (208 lb 9.6 oz)      Vitals:    04/26/24 0741   BP: 134/75   Pulse: 75   Temp: 97.5 °F (36.4 °C)   SpO2: 97%     Temp Readings from Last 3 Encounters:   04/26/24 97.5 °F (36.4 °C)   01/26/24 98.4 °F (36.9 °C)   10/27/23 98 °F (36.7 °C)     BP Readings from Last 3 Encounters:   04/26/24 134/75   04/16/24 123/71   01/26/24 118/76     Pulse Readings from Last 3 Encounters:   04/26/24 75   04/16/24 68   01/26/24 62     Body mass index is 35.69 kg/m².    Physical Exam  Constitutional:       Appearance: She is well-developed.   HENT:      Head: Atraumatic.   Neck:      Vascular: No carotid bruit.   Cardiovascular:      Rate and Rhythm: Normal rate and regular rhythm.      Heart sounds: Normal heart sounds.   Pulmonary:      Effort: Pulmonary effort is normal.      Breath sounds: Normal breath sounds.   Skin:     General: Skin is warm and dry.   Neurological:      Mental Status: She is alert.         Assessment & Plan   Diagnoses and all orders for this visit:    1.  Hyperlipidemia, unspecified hyperlipidemia type (Primary)  -     Comprehensive Metabolic Panel; Future    2. Impaired glucose tolerance  -     Comprehensive Metabolic Panel; Future  -     Hemoglobin A1c; Future    3. Acquired hypothyroidism  -     Thyroid Cascade Profile; Future        Hyperlipidemia-continue current treatment.  Follow-up in 4 to 6 months.    Impaired fasting glucose-A1c better.  Continue to limit carbs.  Increase fruits/vegetables.  Follow-up in 4 months.

## 2024-05-09 DIAGNOSIS — K21.9 GASTROESOPHAGEAL REFLUX DISEASE, UNSPECIFIED WHETHER ESOPHAGITIS PRESENT: ICD-10-CM

## 2024-05-09 RX ORDER — LEVOTHYROXINE SODIUM 112 UG/1
TABLET ORAL
Qty: 84 TABLET | Refills: 1 | Status: SHIPPED | OUTPATIENT
Start: 2024-05-09

## 2024-05-09 RX ORDER — PANTOPRAZOLE SODIUM 40 MG/1
40 TABLET, DELAYED RELEASE ORAL DAILY
Qty: 90 TABLET | Refills: 3 | Status: SHIPPED | OUTPATIENT
Start: 2024-05-09

## 2024-08-23 ENCOUNTER — OFFICE VISIT (OUTPATIENT)
Dept: INTERNAL MEDICINE | Facility: CLINIC | Age: 64
End: 2024-08-23
Payer: COMMERCIAL

## 2024-08-23 VITALS
HEIGHT: 64 IN | WEIGHT: 209.7 LBS | DIASTOLIC BLOOD PRESSURE: 70 MMHG | HEART RATE: 72 BPM | BODY MASS INDEX: 35.8 KG/M2 | OXYGEN SATURATION: 95 % | SYSTOLIC BLOOD PRESSURE: 124 MMHG | TEMPERATURE: 98.2 F

## 2024-08-23 DIAGNOSIS — E03.9 ACQUIRED HYPOTHYROIDISM: ICD-10-CM

## 2024-08-23 DIAGNOSIS — R73.02 IMPAIRED GLUCOSE TOLERANCE: ICD-10-CM

## 2024-08-23 DIAGNOSIS — E78.5 HYPERLIPIDEMIA, UNSPECIFIED HYPERLIPIDEMIA TYPE: Primary | ICD-10-CM

## 2024-08-23 PROCEDURE — 99214 OFFICE O/P EST MOD 30 MIN: CPT | Performed by: FAMILY MEDICINE

## 2024-08-23 RX ORDER — ROSUVASTATIN CALCIUM 10 MG/1
10 TABLET, COATED ORAL DAILY
Qty: 90 TABLET | Refills: 1 | Status: SHIPPED | OUTPATIENT
Start: 2024-08-23

## 2024-08-23 NOTE — PROGRESS NOTES
Subjective   Bhavna Elliott is a 63 y.o. female.   Chief Complaint   Patient presents with    Hyperlipidemia    Hypothyroidism    Diabetes       History of Present Illness     Hyperlipidemia-patient is on rosuvastatin at 10 mg a day.  She takes it every day.  She has no side effects.  No muscle aches, no muscle cramps.       Impaired fasting glucose -  from 118 from 104 from 123.  A1c 6.5 from 6.1 from 6.3 from 6.3.  She walks regularly 3-4 times a week for 2.5 miles.  BMI 36.    hypothyroidism-patient has no history of thyroid surgery.  She is on levothyroxine at 112 µg a day.  She takes 1 tablet 6 days a week, half tablet on Sundays.  She takes it everyday on empty stomach and does not eat for 60 minutes after taking it.  TSH is at 0.589.    Review of Systems   Respiratory:  Negative for shortness of breath.    Cardiovascular:  Negative for chest pain and palpitations.   Musculoskeletal:  Negative for myalgias.   Neurological:  Negative for light-headedness.         Objective   Wt Readings from Last 3 Encounters:   08/23/24 95.1 kg (209 lb 11.2 oz)   04/26/24 94.3 kg (208 lb)   04/16/24 93.6 kg (206 lb 4.8 oz)      Vitals:    08/23/24 0908   BP: 124/70   Pulse: 72   Temp: 98.2 °F (36.8 °C)   SpO2: 95%     Temp Readings from Last 3 Encounters:   08/23/24 98.2 °F (36.8 °C) (Oral)   04/26/24 97.5 °F (36.4 °C)   01/26/24 98.4 °F (36.9 °C)     BP Readings from Last 3 Encounters:   08/23/24 124/70   04/26/24 134/75   04/16/24 123/71     Pulse Readings from Last 3 Encounters:   08/23/24 72   04/26/24 75   04/16/24 68     Body mass index is 35.97 kg/m².    Physical Exam  Constitutional:       Appearance: She is well-developed.   Neck:      Thyroid: No thyromegaly.      Vascular: No carotid bruit.   Cardiovascular:      Rate and Rhythm: Normal rate and regular rhythm.      Heart sounds: Normal heart sounds.   Pulmonary:      Effort: Pulmonary effort is normal.      Breath sounds: Normal breath sounds.   Skin:      General: Skin is warm and dry.   Neurological:      Mental Status: She is alert and oriented to person, place, and time.   Psychiatric:         Behavior: Behavior normal.       Assessment & Plan   Diagnoses and all orders for this visit:    1. Hyperlipidemia, unspecified hyperlipidemia type (Primary)    2. Impaired glucose tolerance  -     Hemoglobin A1c; Future    3. Acquired hypothyroidism    Other orders  -     rosuvastatin (CRESTOR) 10 MG tablet; Take 1 tablet by mouth Daily.  Dispense: 90 tablet; Refill: 1        IFG- A1C the first time in the range of diabetes.  FBS at 123.  Will recheck A1c in 2 weeks.  Patient was provided with information on counting carbs and was given a folder on diabetes.  She is advised to decrease carbs.  Increase exercise to at least 5 days a week.  Follow-up in 3 months.  Labs before office visit.    Hyperlipidemia-continue current treatment.  Will need lipids in 3 months before office visit.    Hypothyroidism-continue current treatment.  Follow-up in 12 months.

## 2024-09-23 ENCOUNTER — TRANSCRIBE ORDERS (OUTPATIENT)
Dept: ADMINISTRATIVE | Facility: HOSPITAL | Age: 64
End: 2024-09-23
Payer: COMMERCIAL

## 2024-09-23 DIAGNOSIS — Z12.31 SCREENING MAMMOGRAM FOR BREAST CANCER: Primary | ICD-10-CM

## 2024-10-21 RX ORDER — LEVOTHYROXINE SODIUM 112 UG/1
TABLET ORAL
Qty: 84 TABLET | Refills: 1 | Status: SHIPPED | OUTPATIENT
Start: 2024-10-21

## 2024-11-15 DIAGNOSIS — E78.5 HYPERLIPIDEMIA, UNSPECIFIED HYPERLIPIDEMIA TYPE: Primary | ICD-10-CM

## 2024-11-15 DIAGNOSIS — R73.02 IMPAIRED GLUCOSE TOLERANCE: ICD-10-CM

## 2024-11-18 LAB
ALBUMIN SERPL-MCNC: 4.3 G/DL (ref 3.5–5.2)
ALBUMIN/GLOB SERPL: 1.5 G/DL
ALP SERPL-CCNC: 49 U/L (ref 39–117)
ALT SERPL-CCNC: 39 U/L (ref 1–33)
AST SERPL-CCNC: 29 U/L (ref 1–32)
BILIRUB SERPL-MCNC: 0.7 MG/DL (ref 0–1.2)
BUN SERPL-MCNC: 15 MG/DL (ref 8–23)
BUN/CREAT SERPL: 20.8 (ref 7–25)
CALCIUM SERPL-MCNC: 9.4 MG/DL (ref 8.6–10.5)
CHLORIDE SERPL-SCNC: 104 MMOL/L (ref 98–107)
CHOLEST SERPL-MCNC: 169 MG/DL (ref 0–200)
CO2 SERPL-SCNC: 23.7 MMOL/L (ref 22–29)
CREAT SERPL-MCNC: 0.72 MG/DL (ref 0.57–1)
EGFRCR SERPLBLD CKD-EPI 2021: 92.9 ML/MIN/1.73
GLOBULIN SER CALC-MCNC: 2.9 GM/DL
GLUCOSE SERPL-MCNC: 99 MG/DL (ref 65–99)
HBA1C MFR BLD: 6.2 % (ref 4.8–5.6)
HDLC SERPL-MCNC: 52 MG/DL (ref 40–60)
LDLC SERPL CALC-MCNC: 102 MG/DL (ref 0–100)
LDLC/HDLC SERPL: 1.95 {RATIO}
POTASSIUM SERPL-SCNC: 4.3 MMOL/L (ref 3.5–5.2)
PROT SERPL-MCNC: 7.2 G/DL (ref 6–8.5)
SODIUM SERPL-SCNC: 139 MMOL/L (ref 136–145)
TRIGL SERPL-MCNC: 77 MG/DL (ref 0–150)
VLDLC SERPL CALC-MCNC: 15 MG/DL (ref 5–40)

## 2024-11-22 ENCOUNTER — OFFICE VISIT (OUTPATIENT)
Dept: INTERNAL MEDICINE | Facility: CLINIC | Age: 64
End: 2024-11-22
Payer: COMMERCIAL

## 2024-11-22 VITALS
TEMPERATURE: 97.3 F | OXYGEN SATURATION: 98 % | HEART RATE: 65 BPM | BODY MASS INDEX: 35.05 KG/M2 | DIASTOLIC BLOOD PRESSURE: 68 MMHG | SYSTOLIC BLOOD PRESSURE: 118 MMHG | HEIGHT: 64 IN | WEIGHT: 205.3 LBS

## 2024-11-22 DIAGNOSIS — R73.02 IMPAIRED GLUCOSE TOLERANCE: ICD-10-CM

## 2024-11-22 DIAGNOSIS — E78.5 HYPERLIPIDEMIA, UNSPECIFIED HYPERLIPIDEMIA TYPE: Primary | ICD-10-CM

## 2024-11-22 PROCEDURE — 99213 OFFICE O/P EST LOW 20 MIN: CPT | Performed by: FAMILY MEDICINE

## 2024-11-22 NOTE — PROGRESS NOTES
Subjective   Bhavna Elliott is a 64 y.o. female.   Chief Complaint   Patient presents with    Hyperlipidemia    Impaired Fasting Glucose       History of Present Illness     Hyperlipidemia-patient is on rosuvastatin at 10 mg a day.  She takes it every day.  She has no side effects.  No muscle aches.  Occasionally she has muscle cramps.   from 111, HDL 52.  LFTs stable.     Impaired fasting glucose - FBS 99 from 123 from 118 from 104 from 123.  A1c 6.2 from 6.2 from 6.5 from 6.1 from 6.3 from 6.3.  She walks regularly 3-4 times a week for 45 minutes.  She improved eating habits.  She eats no sweets except of 1 piece of dark chocolate a day, she significantly decreased amount of carbs.  BMI 35.2 from 36.       Review of Systems   Respiratory:  Negative for shortness of breath.    Cardiovascular:  Negative for chest pain and palpitations.   Musculoskeletal:  Negative for myalgias.   Neurological:  Negative for light-headedness.       Objective   Wt Readings from Last 3 Encounters:   11/22/24 93.1 kg (205 lb 4.8 oz)   08/23/24 95.1 kg (209 lb 11.2 oz)   04/26/24 94.3 kg (208 lb)      Vitals:    11/22/24 0833   BP: 118/68   Pulse: 65   Temp: 97.3 °F (36.3 °C)   SpO2: 98%     Temp Readings from Last 3 Encounters:   11/22/24 97.3 °F (36.3 °C)   08/23/24 98.2 °F (36.8 °C) (Oral)   04/26/24 97.5 °F (36.4 °C)     BP Readings from Last 3 Encounters:   11/22/24 118/68   08/23/24 124/70   04/26/24 134/75     Pulse Readings from Last 3 Encounters:   11/22/24 65   08/23/24 72   04/26/24 75     Body mass index is 35.22 kg/m².    Physical Exam  Constitutional:       Appearance: She is well-developed.   Neck:      Thyroid: No thyromegaly.      Vascular: No carotid bruit.   Cardiovascular:      Rate and Rhythm: Normal rate and regular rhythm.      Heart sounds: Normal heart sounds.   Pulmonary:      Effort: Pulmonary effort is normal.      Breath sounds: Normal breath sounds.   Skin:     General: Skin is warm and dry.    Neurological:      Mental Status: She is alert.   Psychiatric:         Behavior: Behavior normal.         Assessment & Plan   Diagnoses and all orders for this visit:    1. Hyperlipidemia, unspecified hyperlipidemia type (Primary)    2. Impaired glucose tolerance        HLP-continue current treatment.  Follow-up in 6 months.    Impaired fasting glucose-positive changes in eating habits.  She will continue to limit carbs.  Exercise at least 30 min a day, 5 days a week.  Labs in 3 months prior to office visit.    She is getting PCV 20 today.

## 2024-12-19 ENCOUNTER — HOSPITAL ENCOUNTER (OUTPATIENT)
Dept: MAMMOGRAPHY | Facility: HOSPITAL | Age: 64
Discharge: HOME | End: 2024-12-19
Admitting: FAMILY MEDICINE
Payer: COMMERCIAL

## 2024-12-19 DIAGNOSIS — Z12.31 SCREENING MAMMOGRAM FOR BREAST CANCER: ICD-10-CM

## 2024-12-19 PROCEDURE — 77067 SCR MAMMO BI INCL CAD: CPT

## 2024-12-19 PROCEDURE — 77063 BREAST TOMOSYNTHESIS BI: CPT

## 2025-02-21 DIAGNOSIS — R73.02 IMPAIRED GLUCOSE TOLERANCE: ICD-10-CM

## 2025-02-22 LAB
BUN SERPL-MCNC: 13 MG/DL (ref 8–23)
BUN/CREAT SERPL: 17.6 (ref 7–25)
CALCIUM SERPL-MCNC: 9.3 MG/DL (ref 8.6–10.5)
CHLORIDE SERPL-SCNC: 106 MMOL/L (ref 98–107)
CO2 SERPL-SCNC: 23.6 MMOL/L (ref 22–29)
CREAT SERPL-MCNC: 0.74 MG/DL (ref 0.57–1)
EGFRCR SERPLBLD CKD-EPI 2021: 89.9 ML/MIN/1.73
GLUCOSE SERPL-MCNC: 121 MG/DL (ref 65–99)
HBA1C MFR BLD: 6.4 % (ref 4.8–5.6)
POTASSIUM SERPL-SCNC: 4.3 MMOL/L (ref 3.5–5.2)
SODIUM SERPL-SCNC: 141 MMOL/L (ref 136–145)

## 2025-02-28 ENCOUNTER — OFFICE VISIT (OUTPATIENT)
Dept: INTERNAL MEDICINE | Facility: CLINIC | Age: 65
End: 2025-02-28
Payer: COMMERCIAL

## 2025-02-28 VITALS
DIASTOLIC BLOOD PRESSURE: 80 MMHG | HEIGHT: 64 IN | OXYGEN SATURATION: 95 % | WEIGHT: 205 LBS | SYSTOLIC BLOOD PRESSURE: 130 MMHG | TEMPERATURE: 98 F | BODY MASS INDEX: 35 KG/M2 | HEART RATE: 78 BPM

## 2025-02-28 DIAGNOSIS — E03.9 ACQUIRED HYPOTHYROIDISM: ICD-10-CM

## 2025-02-28 DIAGNOSIS — E78.5 HYPERLIPIDEMIA, UNSPECIFIED HYPERLIPIDEMIA TYPE: ICD-10-CM

## 2025-02-28 DIAGNOSIS — R73.02 IMPAIRED GLUCOSE TOLERANCE: Primary | ICD-10-CM

## 2025-02-28 PROCEDURE — 99213 OFFICE O/P EST LOW 20 MIN: CPT | Performed by: FAMILY MEDICINE

## 2025-02-28 NOTE — PROGRESS NOTES
Subjective   Bhavna Elliott is a 64 y.o. female.   Chief Complaint   Patient presents with    Hyperlipidemia       History of Present Illness      Impaired fasting glucose -  from 99 from 123 from 118 from 104 from 123.  A1c 6.4 from 6.2 from 6.2 from 6.5 from 6.1 from 6.3 from 6.3.  No recent treatment with steroids.  She was on vacation for couple weeks.  She relaxed her diet. BMI 35.2.    Review of Systems   Constitutional:  Positive for fatigue.   Respiratory: Negative.     Cardiovascular: Negative.          Objective   Wt Readings from Last 3 Encounters:   02/28/25 93 kg (205 lb)   11/22/24 93.1 kg (205 lb 4.8 oz)   08/23/24 95.1 kg (209 lb 11.2 oz)      Vitals:    02/28/25 0906   BP: 130/80   Pulse: 78   Temp: 98 °F (36.7 °C)   SpO2: 95%     Temp Readings from Last 3 Encounters:   02/28/25 98 °F (36.7 °C)   11/22/24 97.3 °F (36.3 °C)   08/23/24 98.2 °F (36.8 °C) (Oral)     BP Readings from Last 3 Encounters:   02/28/25 130/80   11/22/24 118/68   08/23/24 124/70     Pulse Readings from Last 3 Encounters:   02/28/25 78   11/22/24 65   08/23/24 72     Body mass index is 35.17 kg/m².    Physical Exam  Constitutional:       Appearance: She is well-developed.   Neck:      Vascular: No carotid bruit.   Cardiovascular:      Rate and Rhythm: Normal rate and regular rhythm.      Heart sounds: Normal heart sounds.   Pulmonary:      Effort: Pulmonary effort is normal.      Breath sounds: Normal breath sounds.   Skin:     General: Skin is warm and dry.   Neurological:      Mental Status: She is alert.   Psychiatric:         Behavior: Behavior normal.         Assessment & Plan   Diagnoses and all orders for this visit:    1. Impaired glucose tolerance (Primary)  -     Comprehensive Metabolic Panel; Future  -     Hemoglobin A1c; Future    2. Acquired hypothyroidism  -     CBC (No Diff); Future  -     Thyroid Cascade Profile; Future    3. Hyperlipidemia, unspecified hyperlipidemia type  -     CBC (No Diff);  Future  -     Lipid Panel With LDL / HDL Ratio; Future        IFG-worsening.  We discussed decrease in sweets and carbs.  Information on dietary changes for diabetes added to discharge summary.  Patient is advised to exercise at least 30 min a day, 5 days a week.  Follow-up in 3 months.

## 2025-03-13 DIAGNOSIS — K21.9 GASTROESOPHAGEAL REFLUX DISEASE, UNSPECIFIED WHETHER ESOPHAGITIS PRESENT: ICD-10-CM

## 2025-03-13 RX ORDER — PANTOPRAZOLE SODIUM 40 MG/1
40 TABLET, DELAYED RELEASE ORAL DAILY
Qty: 90 TABLET | Refills: 0 | Status: SHIPPED | OUTPATIENT
Start: 2025-03-13

## 2025-04-07 RX ORDER — ROSUVASTATIN CALCIUM 10 MG/1
10 TABLET, COATED ORAL DAILY
Qty: 90 TABLET | Refills: 1 | Status: SHIPPED | OUTPATIENT
Start: 2025-04-07

## 2025-04-11 RX ORDER — LEVOTHYROXINE SODIUM 112 UG/1
TABLET ORAL
Qty: 84 TABLET | Refills: 1 | Status: SHIPPED | OUTPATIENT
Start: 2025-04-11

## 2025-06-13 ENCOUNTER — OFFICE VISIT (OUTPATIENT)
Dept: INTERNAL MEDICINE | Facility: CLINIC | Age: 65
End: 2025-06-13
Payer: COMMERCIAL

## 2025-06-13 VITALS
TEMPERATURE: 97.9 F | HEART RATE: 68 BPM | DIASTOLIC BLOOD PRESSURE: 80 MMHG | SYSTOLIC BLOOD PRESSURE: 132 MMHG | HEIGHT: 64 IN | OXYGEN SATURATION: 95 % | WEIGHT: 200.4 LBS | BODY MASS INDEX: 34.21 KG/M2

## 2025-06-13 DIAGNOSIS — R39.89 BLADDER PAIN: ICD-10-CM

## 2025-06-13 DIAGNOSIS — N30.00 ACUTE CYSTITIS WITHOUT HEMATURIA: Primary | ICD-10-CM

## 2025-06-13 LAB
BILIRUB BLD-MCNC: NEGATIVE MG/DL
CLARITY, POC: CLEAR
COLOR UR: YELLOW
EXPIRATION DATE: ABNORMAL
GLUCOSE UR STRIP-MCNC: NEGATIVE MG/DL
KETONES UR QL: NEGATIVE
LEUKOCYTE EST, POC: ABNORMAL
Lab: ABNORMAL
NITRITE UR-MCNC: NEGATIVE MG/ML
PH UR: 6.5 [PH] (ref 5–8)
PROT UR STRIP-MCNC: ABNORMAL MG/DL
RBC # UR STRIP: ABNORMAL /UL
SP GR UR: 1.02 (ref 1–1.03)
UROBILINOGEN UR QL: NORMAL

## 2025-06-13 PROCEDURE — 81003 URINALYSIS AUTO W/O SCOPE: CPT | Performed by: NURSE PRACTITIONER

## 2025-06-13 PROCEDURE — 99213 OFFICE O/P EST LOW 20 MIN: CPT | Performed by: NURSE PRACTITIONER

## 2025-06-13 RX ORDER — CEPHALEXIN 500 MG/1
500 CAPSULE ORAL 2 TIMES DAILY
Qty: 10 CAPSULE | Refills: 0 | Status: SHIPPED | OUTPATIENT
Start: 2025-06-13 | End: 2025-06-18

## 2025-06-13 NOTE — PROGRESS NOTES
Subjective   Bhavna Elliott is a 64 y.o. female.     Chief Complaint   Patient presents with    bladder pain     Pt c/o bladder pain, urgency, frequency and pain at the end of urinating X 1 month.        History of Present Illness   She is here today with concerns for urinary urgency, frequency, dysuria and bladder discomfort.  Symptoms started approximately 4 weeks ago, initially improved and then returned 2 weeks ago.  She denies any fever, chills, hematuria, flank pain, change in vaginal discharge.   She has been trying to hydrate well with fluids.    The following portions of the patient's history were reviewed and updated as appropriate: allergies, current medications, past family history, past medical history, past social history, past surgical history, and problem list.    Review of Systems   Constitutional: Negative.    Respiratory: Negative.     Cardiovascular: Negative.    Gastrointestinal:  Negative for abdominal pain, nausea and vomiting.   Genitourinary:  Positive for dysuria, frequency, pelvic pressure and urgency. Negative for difficulty urinating, flank pain, hematuria, pelvic pain, urinary incontinence, vaginal bleeding, vaginal discharge and vaginal pain.   Psychiatric/Behavioral: Negative.         Objective   Physical Exam  Constitutional:       Appearance: She is well-developed.   Neck:      Thyroid: No thyroid mass, thyromegaly or thyroid tenderness.      Vascular: No carotid bruit.      Trachea: Trachea normal.   Cardiovascular:      Rate and Rhythm: Normal rate and regular rhythm.      Chest Wall: PMI is not displaced.      Pulses:           Radial pulses are 2+ on the right side and 2+ on the left side.        Dorsalis pedis pulses are 2+ on the right side and 2+ on the left side.        Posterior tibial pulses are 2+ on the right side and 2+ on the left side.      Heart sounds: S1 normal and S2 normal.   Pulmonary:      Effort: Pulmonary effort is normal.      Breath sounds: Normal breath  sounds.   Abdominal:      General: Bowel sounds are normal. There is no distension or abdominal bruit. There are no signs of injury.      Palpations: Abdomen is soft. There is no hepatomegaly or splenomegaly.      Tenderness: There is abdominal tenderness in the suprapubic area. There is no right CVA tenderness, left CVA tenderness, guarding or rebound.   Musculoskeletal:      Right lower leg: No edema.      Left lower leg: No edema.   Lymphadenopathy:      Head:      Right side of head: No submental, submandibular, tonsillar or occipital adenopathy.      Left side of head: No submental, submandibular, tonsillar or occipital adenopathy.      Cervical: No cervical adenopathy.   Skin:     General: Skin is warm and dry.      Capillary Refill: Capillary refill takes less than 2 seconds.      Nails: There is no clubbing.   Neurological:      Mental Status: She is alert and oriented to person, place, and time.   Psychiatric:         Attention and Perception: Attention normal.         Mood and Affect: Mood and affect normal.         Speech: Speech normal.         Behavior: Behavior normal.         Thought Content: Thought content normal.         Cognition and Memory: Cognition normal.         Vitals:    06/13/25 0803   BP: 132/80   Pulse: 68   Temp: 97.9 °F (36.6 °C)   SpO2: 95%      Body mass index is 34.38 kg/m².    Assessment & Plan   Problems Addressed this Visit    None  Visit Diagnoses         Acute cystitis without hematuria    -  Primary    Relevant Medications    cephalexin (KEFLEX) 500 MG capsule    Other Relevant Orders    POCT urinalysis dipstick, automated (Completed)    Urine Culture - Urine, Urine, Clean Catch      Bladder pain        Relevant Orders    POCT urinalysis dipstick, automated (Completed)    Urine Culture - Urine, Urine, Clean Catch          Diagnoses         Codes Comments      Acute cystitis without hematuria    -  Primary ICD-10-CM: N30.00  ICD-9-CM: 595.0       Bladder pain     ICD-10-CM:  R39.89  ICD-9-CM: 788.99           1.  Acute cystitis-urine dip positive for leuks, protein and blood, urine sent for culture.  Start cephalexin 500 mg twice daily for 5 days.  Take antibiotic with food and start a probiotic or yogurt separate from the antibiotic.  Recommend following up with PCP as scheduled in 1 week to ensure resolution of symptoms.

## 2025-06-18 LAB
BACTERIA UR CULT: ABNORMAL
BACTERIA UR CULT: ABNORMAL
OTHER ANTIBIOTIC SUSC ISLT: ABNORMAL

## 2025-06-23 ENCOUNTER — OFFICE VISIT (OUTPATIENT)
Dept: INTERNAL MEDICINE | Facility: CLINIC | Age: 65
End: 2025-06-23
Payer: COMMERCIAL

## 2025-06-23 VITALS
WEIGHT: 202 LBS | TEMPERATURE: 97.3 F | DIASTOLIC BLOOD PRESSURE: 70 MMHG | HEART RATE: 70 BPM | SYSTOLIC BLOOD PRESSURE: 126 MMHG | BODY MASS INDEX: 34.49 KG/M2 | OXYGEN SATURATION: 97 % | HEIGHT: 64 IN

## 2025-06-23 DIAGNOSIS — E78.5 HYPERLIPIDEMIA, UNSPECIFIED HYPERLIPIDEMIA TYPE: Primary | ICD-10-CM

## 2025-06-23 DIAGNOSIS — R73.02 IMPAIRED GLUCOSE TOLERANCE: ICD-10-CM

## 2025-06-23 DIAGNOSIS — E03.9 ACQUIRED HYPOTHYROIDISM: ICD-10-CM

## 2025-06-23 PROCEDURE — 99214 OFFICE O/P EST MOD 30 MIN: CPT | Performed by: FAMILY MEDICINE

## 2025-06-23 NOTE — PROGRESS NOTES
Subjective   Bhavna Elliott is a 64 y.o. female.   Chief Complaint   Patient presents with    impaired fasting glucose        History of Present Illness     Impaired fasting glucose -  from 121 from 99 from 123 from 118 from 104 from 123.  A1c  6.1 from 6.4 from 6.2 from 6.2 from 6.5 from 6.1 from 6.3 from 6.3.  Walks 4 times a week for more than an hour.  She eats more salads.  She relaxed her diet. BMI 34.7 from 35.2.     Hyperlipidemia-patient is on rosuvastatin at 10 mg a day.  She takes it every day.  She has no side effects.  No muscle aches.  Occasionally she has muscle cramps.  LDL at 99, HDL at 50.  LFTs normal.    hypothyroidism-patient has no history of thyroid surgery.  She is on levothyroxine at 112 µg a day.  She takes 1 tablet 6 days a week, half tablet on Sundays.  She takes it everyday on empty stomach and does not eat for 60 minutes after taking it.  TSH is at 1.1.    Review of Systems   Respiratory: Negative.     Cardiovascular: Negative.    Musculoskeletal:  Negative for myalgias.   Neurological:  Negative for light-headedness.         Objective   Wt Readings from Last 3 Encounters:   06/23/25 91.6 kg (202 lb)   06/13/25 90.9 kg (200 lb 6.4 oz)   02/28/25 93 kg (205 lb)      Vitals:    06/23/25 1036   BP: 126/70   Pulse: 70   Temp: 97.3 °F (36.3 °C)   SpO2: 97%     Temp Readings from Last 3 Encounters:   06/23/25 97.3 °F (36.3 °C)   06/13/25 97.9 °F (36.6 °C) (Oral)   02/28/25 98 °F (36.7 °C)     BP Readings from Last 3 Encounters:   06/23/25 126/70   06/13/25 132/80   02/28/25 130/80     Pulse Readings from Last 3 Encounters:   06/23/25 70   06/13/25 68   02/28/25 78     Body mass index is 34.66 kg/m².    Physical Exam  Constitutional:       Appearance: She is well-developed.   Neck:      Vascular: No carotid bruit.   Cardiovascular:      Rate and Rhythm: Normal rate and regular rhythm.      Heart sounds: Normal heart sounds.   Pulmonary:      Effort: Pulmonary effort is normal.       Breath sounds: Normal breath sounds.   Skin:     General: Skin is warm and dry.   Neurological:      Mental Status: She is alert.         Assessment & Plan   Diagnoses and all orders for this visit:    1. Hyperlipidemia, unspecified hyperlipidemia type (Primary)    2. Impaired glucose tolerance  -     Basic Metabolic Panel; Future  -     Hemoglobin A1c; Future    3. Acquired hypothyroidism        Hyperlipidemia-continue current treatment with follow-up in 6 months.    Impaired fasting glucose-A1c better, but FBS worse .  Start food diary.  Limit carbs and sweets.  Follow-up in 3 months.  1 week prior to office visit.    Hypothyroidism-continue current treatment.  Follow-up in 12 months.

## (undated) DEVICE — SINGLE-USE BIOPSY FORCEPS: Brand: RADIAL JAW 4

## (undated) DEVICE — CANN O2 ETCO2 FITS ALL CONN CO2 SMPL A/ 7IN DISP LF

## (undated) DEVICE — SENSR O2 OXIMAX FNGR A/ 18IN NONSTR

## (undated) DEVICE — ESOPHAGEAL BALLOON DILATATION CATHETER: Brand: CRE FIXED WIRE

## (undated) DEVICE — TUBING, SUCTION, 1/4" X 10', STRAIGHT: Brand: MEDLINE

## (undated) DEVICE — ADAPT CLN BIOGUARD AIR/H2O DISP

## (undated) DEVICE — LN SMPL CO2 SHTRM SD STREAM W/M LUER

## (undated) DEVICE — KT ORCA ORCAPOD DISP STRL

## (undated) DEVICE — CANN NASL CO2 TRULINK W/O2 A/

## (undated) DEVICE — THE DISPOSABLE RAPTOR GRASPING DEVICE IS USED TO GRASP TISSUE AND/OR RETRIEVE FOREIGN BODIES, EXCISED TISSUE AND STENTS DURING ENDOSCOPIC PROCEDURES.: Brand: RAPTOR

## (undated) DEVICE — MULTIPLE BAND LIGATOR: Brand: SPEEDBAND SUPERVIEW SUPER 7

## (undated) DEVICE — Device: Brand: DEFENDO AIR/WATER/SUCTION AND BIOPSY VALVE

## (undated) DEVICE — DEV INFL CRE STERIFLATE 60CC DISP

## (undated) DEVICE — NET RETRV ROTHNET SELCT 2.5MM 3X6X230CM NS

## (undated) DEVICE — TBG 02 CRUSH RESIST LF CLR 7FT

## (undated) DEVICE — BITEBLOCK OMNI BLOC

## (undated) DEVICE — THE TORRENT IRRIGATION SCOPE CONNECTOR IS USED WITH THE TORRENT IRRIGATION TUBING TO PROVIDE IRRIGATION FLUIDS SUCH AS STERILE WATER DURING GASTROINTESTINAL ENDOSCOPIC PROCEDURES WHEN USED IN CONJUNCTION WITH AN IRRIGATION PUMP (OR ELECTROSURGICAL UNIT).: Brand: TORRENT